# Patient Record
Sex: FEMALE | Race: WHITE | NOT HISPANIC OR LATINO | Employment: UNEMPLOYED | URBAN - NONMETROPOLITAN AREA
[De-identification: names, ages, dates, MRNs, and addresses within clinical notes are randomized per-mention and may not be internally consistent; named-entity substitution may affect disease eponyms.]

---

## 2018-12-04 DIAGNOSIS — M54.9 BACK PAIN, UNSPECIFIED BACK LOCATION, UNSPECIFIED BACK PAIN LATERALITY, UNSPECIFIED CHRONICITY: Primary | ICD-10-CM

## 2018-12-05 ENCOUNTER — OFFICE VISIT (OUTPATIENT)
Dept: ORTHOPEDIC SURGERY | Facility: CLINIC | Age: 53
End: 2018-12-05

## 2018-12-05 VITALS — WEIGHT: 217 LBS | HEIGHT: 65 IN | BODY MASS INDEX: 36.15 KG/M2

## 2018-12-05 DIAGNOSIS — M51.36 DDD (DEGENERATIVE DISC DISEASE), LUMBAR: ICD-10-CM

## 2018-12-05 DIAGNOSIS — M54.9 BACK PAIN, UNSPECIFIED BACK LOCATION, UNSPECIFIED BACK PAIN LATERALITY, UNSPECIFIED CHRONICITY: Primary | ICD-10-CM

## 2018-12-05 PROCEDURE — 99204 OFFICE O/P NEW MOD 45 MIN: CPT | Performed by: ORTHOPAEDIC SURGERY

## 2018-12-05 RX ORDER — HYDROCODONE BITARTRATE AND ACETAMINOPHEN 7.5; 325 MG/1; MG/1
1 TABLET ORAL EVERY 6 HOURS PRN
COMMUNITY
End: 2018-12-13 | Stop reason: HOSPADM

## 2018-12-05 RX ORDER — RANITIDINE 150 MG/1
300 TABLET ORAL DAILY
COMMUNITY

## 2018-12-05 RX ORDER — SPIRONOLACTONE 25 MG/1
12.5 TABLET ORAL DAILY
COMMUNITY

## 2018-12-05 RX ORDER — TIZANIDINE 4 MG/1
4 TABLET ORAL EVERY 8 HOURS PRN
COMMUNITY

## 2018-12-05 RX ORDER — BUPIVACAINE HCL/0.9 % NACL/PF 0.1 %
2 PLASTIC BAG, INJECTION (ML) EPIDURAL ONCE
Status: CANCELLED | OUTPATIENT
Start: 2018-12-11 | End: 2018-12-05
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
## 2018-12-10 ENCOUNTER — ANESTHESIA EVENT (OUTPATIENT)
Dept: PERIOP | Facility: HOSPITAL | Age: 53
End: 2018-12-10

## 2018-12-10 ENCOUNTER — APPOINTMENT (OUTPATIENT)
Dept: PREADMISSION TESTING | Facility: HOSPITAL | Age: 53
End: 2018-12-10

## 2018-12-10 ENCOUNTER — HOSPITAL ENCOUNTER (OUTPATIENT)
Dept: GENERAL RADIOLOGY | Facility: HOSPITAL | Age: 53
Discharge: HOME | End: 2018-12-10
Admitting: ORTHOPAEDIC SURGERY

## 2018-12-10 VITALS
HEIGHT: 65 IN | BODY MASS INDEX: 36.15 KG/M2 | OXYGEN SATURATION: 99 % | WEIGHT: 217 LBS | RESPIRATION RATE: 12 BRPM | HEART RATE: 61 BPM | DIASTOLIC BLOOD PRESSURE: 66 MMHG | SYSTOLIC BLOOD PRESSURE: 123 MMHG

## 2018-12-10 LAB
ABO GROUP BLD: NORMAL
ANION GAP SERPL CALCULATED.3IONS-SCNC: 9 MMOL/L (ref 5–15)
APTT PPP: 33.2 SECONDS (ref 20–40.3)
BLD GP AB SCN SERPL QL: NEGATIVE
BUN BLD-MCNC: 13 MG/DL (ref 7–21)
BUN/CREAT SERPL: 19.7 (ref 7–25)
CALCIUM SPEC-SCNC: 9.9 MG/DL (ref 8.4–10.2)
CHLORIDE SERPL-SCNC: 100 MMOL/L (ref 95–110)
CO2 SERPL-SCNC: 29 MMOL/L (ref 22–31)
CREAT BLD-MCNC: 0.66 MG/DL (ref 0.5–1)
DEPRECATED RDW RBC AUTO: 37.2 FL (ref 36.4–46.3)
ERYTHROCYTE [DISTWIDTH] IN BLOOD BY AUTOMATED COUNT: 12 % (ref 11.5–14.5)
GFR SERPL CREATININE-BSD FRML MDRD: 94 ML/MIN/1.73 (ref 51–120)
GLUCOSE BLD-MCNC: 76 MG/DL (ref 60–100)
HCT VFR BLD AUTO: 38.3 % (ref 35–45)
HGB BLD-MCNC: 13.4 G/DL (ref 12–15.5)
INR PPP: 1.03 (ref 0.8–1.2)
Lab: NORMAL
MCH RBC QN AUTO: 29.8 PG (ref 26.5–34)
MCHC RBC AUTO-ENTMCNC: 35 G/DL (ref 31.4–36)
MCV RBC AUTO: 85.1 FL (ref 80–98)
MRSA DNA SPEC QL NAA+PROBE: NEGATIVE
PLATELET # BLD AUTO: 206 10*3/MM3 (ref 150–450)
PMV BLD AUTO: 11 FL (ref 8–12)
POTASSIUM BLD-SCNC: 4 MMOL/L (ref 3.5–5.1)
PROTHROMBIN TIME: 13.3 SECONDS (ref 11.1–15.3)
RBC # BLD AUTO: 4.5 10*6/MM3 (ref 3.77–5.16)
RH BLD: POSITIVE
SODIUM BLD-SCNC: 138 MMOL/L (ref 137–145)
T&S EXPIRATION DATE: NORMAL
WBC NRBC COR # BLD: 4.2 10*3/MM3 (ref 3.2–9.8)

## 2018-12-10 PROCEDURE — 86900 BLOOD TYPING SEROLOGIC ABO: CPT | Performed by: ANESTHESIOLOGY

## 2018-12-10 PROCEDURE — 86850 RBC ANTIBODY SCREEN: CPT | Performed by: ANESTHESIOLOGY

## 2018-12-10 PROCEDURE — 86901 BLOOD TYPING SEROLOGIC RH(D): CPT | Performed by: ANESTHESIOLOGY

## 2018-12-10 PROCEDURE — 85610 PROTHROMBIN TIME: CPT | Performed by: ORTHOPAEDIC SURGERY

## 2018-12-10 PROCEDURE — 93005 ELECTROCARDIOGRAM TRACING: CPT | Performed by: ORTHOPAEDIC SURGERY

## 2018-12-10 PROCEDURE — 85027 COMPLETE CBC AUTOMATED: CPT | Performed by: ORTHOPAEDIC SURGERY

## 2018-12-10 PROCEDURE — 80048 BASIC METABOLIC PNL TOTAL CA: CPT | Performed by: ORTHOPAEDIC SURGERY

## 2018-12-10 PROCEDURE — 93010 ELECTROCARDIOGRAM REPORT: CPT | Performed by: INTERNAL MEDICINE

## 2018-12-10 PROCEDURE — 87641 MR-STAPH DNA AMP PROBE: CPT | Performed by: ORTHOPAEDIC SURGERY

## 2018-12-10 PROCEDURE — 71046 X-RAY EXAM CHEST 2 VIEWS: CPT

## 2018-12-10 PROCEDURE — 85730 THROMBOPLASTIN TIME PARTIAL: CPT | Performed by: ORTHOPAEDIC SURGERY

## 2018-12-10 RX ORDER — ALBUTEROL SULFATE 90 UG/1
2 AEROSOL, METERED RESPIRATORY (INHALATION) EVERY 4 HOURS PRN
COMMUNITY

## 2018-12-10 RX ORDER — SODIUM CHLORIDE 9 MG/ML
1000 INJECTION, SOLUTION INTRAVENOUS CONTINUOUS
Status: CANCELLED | OUTPATIENT
Start: 2018-12-11

## 2018-12-10 NOTE — DISCHARGE INSTRUCTIONS
[facility]  Pre-op Information and Guidelines    You will be called after 2 p.m. the day before your surgery (Friday for Monday surgery) and notified of your time for arrival and approximate surgery time.  If you have not received a call by 4P.M., please contact Same Day Surgery at [phone] of if outside [facility] call [phone].    Please Follow these Important Safety Guidelines:    • The morning of your procedure, take only the medications listed below with   A sip of water:_____________________________________________       __NORCO, ZANTAC____________________________    • DO NOT eat or drink anything after 12:00 midnight the night before surgery  Specific instructions concerning drinking clear liquids will be discussed during  the pre-surgery instruction call the day before your surgery.    • If you take a blood thinner (ex. Plavix, Coumadin, aspirin), ask your doctor when to stop it before surgery  STOP DATE: _________________    • Only 2 visitors are allowed in patient rooms at a time  Your visitors will be asked to wait in the lobby until the admission process is complete with the exception of a parent with a child and patients in need of special assistance.    • YOU CANNOT DRIVE YOURSELF HOME  You must be accompanied by someone who will be responsible for driving you home after surgery and for your care at home.    • DO NOT chew gum, use breath mints, hard candy, or smoke the day of surgery  • DO NOT drink alcohol for at least 24 hours before your surgery  • DO NOT wear any jewelry and remove all body piercing before coming to the hospital  • DO NOT wear make-up to the hospital  • If you are having surgery on an extremity (arm/leg/foot) remove nail polish/artificial nails on the surgical side  • Clothing, glasses, contacts, dentures, and hairpieces must be removed before surgery  • Bathe the night before or the morning of your surgery and do not use powders/lotions on  skin.

## 2018-12-11 ENCOUNTER — ANESTHESIA (OUTPATIENT)
Dept: PERIOP | Facility: HOSPITAL | Age: 53
End: 2018-12-11

## 2018-12-11 ENCOUNTER — APPOINTMENT (OUTPATIENT)
Dept: GENERAL RADIOLOGY | Facility: HOSPITAL | Age: 53
End: 2018-12-11

## 2018-12-11 ENCOUNTER — HOSPITAL ENCOUNTER (INPATIENT)
Facility: HOSPITAL | Age: 53
LOS: 2 days | Discharge: HOME HEALTH | End: 2018-12-13
Attending: ORTHOPAEDIC SURGERY | Admitting: ORTHOPAEDIC SURGERY

## 2018-12-11 DIAGNOSIS — M54.9 BACK PAIN, UNSPECIFIED BACK LOCATION, UNSPECIFIED BACK PAIN LATERALITY, UNSPECIFIED CHRONICITY: ICD-10-CM

## 2018-12-11 DIAGNOSIS — M51.36 DDD (DEGENERATIVE DISC DISEASE), LUMBAR: ICD-10-CM

## 2018-12-11 DIAGNOSIS — Z74.09 IMPAIRED FUNCTIONAL MOBILITY, BALANCE, GAIT, AND ENDURANCE: Primary | ICD-10-CM

## 2018-12-11 LAB
ABO GROUP BLD: NORMAL
BLD GP AB SCN SERPL QL: NEGATIVE
GLUCOSE BLDC GLUCOMTR-MCNC: 258 MG/DL (ref 70–130)
GLUCOSE BLDC GLUCOMTR-MCNC: 294 MG/DL (ref 70–130)
Lab: NORMAL
RH BLD: POSITIVE
T&S EXPIRATION DATE: NORMAL

## 2018-12-11 PROCEDURE — 25010000002 PROPOFOL 1000 MG/ML EMULSION: Performed by: NURSE ANESTHETIST, CERTIFIED REGISTERED

## 2018-12-11 PROCEDURE — 22853 INSJ BIOMECHANICAL DEVICE: CPT | Performed by: ORTHOPAEDIC SURGERY

## 2018-12-11 PROCEDURE — 25010000002 FENTANYL CITRATE (PF) 100 MCG/2ML SOLUTION: Performed by: NURSE ANESTHETIST, CERTIFIED REGISTERED

## 2018-12-11 PROCEDURE — C1769 GUIDE WIRE: HCPCS | Performed by: ORTHOPAEDIC SURGERY

## 2018-12-11 PROCEDURE — 0SG10A0 FUSION OF 2 OR MORE LUMBAR VERTEBRAL JOINTS WITH INTERBODY FUSION DEVICE, ANTERIOR APPROACH, ANTERIOR COLUMN, OPEN APPROACH: ICD-10-PCS | Performed by: ORTHOPAEDIC SURGERY

## 2018-12-11 PROCEDURE — 22533 ARTHRD LAT XTRCVTRY TQ LMBR: CPT | Performed by: ORTHOPAEDIC SURGERY

## 2018-12-11 PROCEDURE — C1713 ANCHOR/SCREW BN/BN,TIS/BN: HCPCS | Performed by: ORTHOPAEDIC SURGERY

## 2018-12-11 PROCEDURE — 25810000003 SODIUM CHLORIDE 0.9 % WITH KCL 20 MEQ 20-0.9 MEQ/L-% SOLUTION: Performed by: ORTHOPAEDIC SURGERY

## 2018-12-11 PROCEDURE — 22845 INSERT SPINE FIXATION DEVICE: CPT | Performed by: ORTHOPAEDIC SURGERY

## 2018-12-11 PROCEDURE — 82962 GLUCOSE BLOOD TEST: CPT

## 2018-12-11 PROCEDURE — 72100 X-RAY EXAM L-S SPINE 2/3 VWS: CPT | Performed by: ORTHOPAEDIC SURGERY

## 2018-12-11 PROCEDURE — 25010000002 HYDROMORPHONE 1 MG/ML SOLUTION: Performed by: NURSE ANESTHETIST, CERTIFIED REGISTERED

## 2018-12-11 PROCEDURE — 25010000002 HYDROMORPHONE 1 MG/ML SOLUTION: Performed by: ORTHOPAEDIC SURGERY

## 2018-12-11 PROCEDURE — 22534 ARTHRD LAT XTRCVTRY TQ EA AD: CPT | Performed by: ORTHOPAEDIC SURGERY

## 2018-12-11 PROCEDURE — 3E0U0GB INTRODUCTION OF RECOMBINANT BONE MORPHOGENETIC PROTEIN INTO JOINTS, OPEN APPROACH: ICD-10-PCS | Performed by: ORTHOPAEDIC SURGERY

## 2018-12-11 PROCEDURE — 94799 UNLISTED PULMONARY SVC/PX: CPT

## 2018-12-11 PROCEDURE — 25010000002 CEFAZOLIN PER 500 MG: Performed by: ORTHOPAEDIC SURGERY

## 2018-12-11 PROCEDURE — 25010000002 ONDANSETRON PER 1 MG: Performed by: NURSE ANESTHETIST, CERTIFIED REGISTERED

## 2018-12-11 PROCEDURE — 86900 BLOOD TYPING SEROLOGIC ABO: CPT | Performed by: ORTHOPAEDIC SURGERY

## 2018-12-11 PROCEDURE — 25010000002 PROPOFOL 10 MG/ML EMULSION: Performed by: NURSE ANESTHETIST, CERTIFIED REGISTERED

## 2018-12-11 PROCEDURE — 63710000001 INSULIN ASPART PER 5 UNITS: Performed by: ORTHOPAEDIC SURGERY

## 2018-12-11 PROCEDURE — G8979 MOBILITY GOAL STATUS: HCPCS

## 2018-12-11 PROCEDURE — 86850 RBC ANTIBODY SCREEN: CPT | Performed by: ORTHOPAEDIC SURGERY

## 2018-12-11 PROCEDURE — 25010000002 METHOCARBAMOL 1000 MG/10ML SOLUTION 10 ML VIAL: Performed by: ORTHOPAEDIC SURGERY

## 2018-12-11 PROCEDURE — 86901 BLOOD TYPING SEROLOGIC RH(D): CPT | Performed by: ORTHOPAEDIC SURGERY

## 2018-12-11 PROCEDURE — 25010000002 MIDAZOLAM PER 1 MG: Performed by: NURSE ANESTHETIST, CERTIFIED REGISTERED

## 2018-12-11 PROCEDURE — G8978 MOBILITY CURRENT STATUS: HCPCS

## 2018-12-11 PROCEDURE — 97162 PT EVAL MOD COMPLEX 30 MIN: CPT

## 2018-12-11 PROCEDURE — 25010000002 SUCCINYLCHOLINE PER 20 MG: Performed by: NURSE ANESTHETIST, CERTIFIED REGISTERED

## 2018-12-11 PROCEDURE — 76000 FLUOROSCOPY <1 HR PHYS/QHP: CPT

## 2018-12-11 DEVICE — BONE GRAFT KIT 7510400 INFUSE MEDIUM
Type: IMPLANTABLE DEVICE | Site: SPINE LUMBAR | Status: FUNCTIONAL
Brand: INFUSE® BONE GRAFT

## 2018-12-11 DEVICE — PLATE 2140002 OLIF25 2-HOLE PLATE LARGE
Type: IMPLANTABLE DEVICE | Site: SPINE LUMBAR | Status: FUNCTIONAL
Brand: PIVOX™ OBLIQUE LATERAL SPINAL SYSTEM

## 2018-12-11 DEVICE — BIOLOGICS 7600703 MSTRGRFT PUTY 3 CC KIT
Type: IMPLANTABLE DEVICE | Site: SPINE LUMBAR | Status: FUNCTIONAL
Brand: MASTERGRAFT®PUTTY

## 2018-12-11 DEVICE — BIOLOGICS 7600709 MSTRGRFT PUTY 9 CC KIT
Type: IMPLANTABLE DEVICE | Site: SPINE LUMBAR | Status: FUNCTIONAL
Brand: MASTERGRAFT®PUTTY

## 2018-12-11 RX ORDER — FLUMAZENIL 0.1 MG/ML
0.2 INJECTION INTRAVENOUS AS NEEDED
Status: DISCONTINUED | OUTPATIENT
Start: 2018-12-11 | End: 2018-12-11 | Stop reason: HOSPADM

## 2018-12-11 RX ORDER — MIDAZOLAM HYDROCHLORIDE 1 MG/ML
INJECTION INTRAMUSCULAR; INTRAVENOUS AS NEEDED
Status: DISCONTINUED | OUTPATIENT
Start: 2018-12-11 | End: 2018-12-11 | Stop reason: SURG

## 2018-12-11 RX ORDER — EPHEDRINE SULFATE 50 MG/ML
5 INJECTION, SOLUTION INTRAVENOUS ONCE AS NEEDED
Status: DISCONTINUED | OUTPATIENT
Start: 2018-12-11 | End: 2018-12-11 | Stop reason: HOSPADM

## 2018-12-11 RX ORDER — SUCCINYLCHOLINE CHLORIDE 20 MG/ML
INJECTION INTRAMUSCULAR; INTRAVENOUS AS NEEDED
Status: DISCONTINUED | OUTPATIENT
Start: 2018-12-11 | End: 2018-12-11 | Stop reason: SURG

## 2018-12-11 RX ORDER — LIDOCAINE HYDROCHLORIDE 20 MG/ML
INJECTION, SOLUTION INFILTRATION; PERINEURAL AS NEEDED
Status: DISCONTINUED | OUTPATIENT
Start: 2018-12-11 | End: 2018-12-11 | Stop reason: SURG

## 2018-12-11 RX ORDER — BACITRACIN 50000 [IU]/1
INJECTION, POWDER, FOR SOLUTION INTRAMUSCULAR AS NEEDED
Status: DISCONTINUED | OUTPATIENT
Start: 2018-12-11 | End: 2018-12-13 | Stop reason: HOSPADM

## 2018-12-11 RX ORDER — SODIUM CHLORIDE 9 MG/ML
1000 INJECTION, SOLUTION INTRAVENOUS CONTINUOUS
Status: ACTIVE | OUTPATIENT
Start: 2018-12-11 | End: 2018-12-12

## 2018-12-11 RX ORDER — ALBUTEROL SULFATE 2.5 MG/3ML
2.5 SOLUTION RESPIRATORY (INHALATION) EVERY 4 HOURS PRN
Status: DISCONTINUED | OUTPATIENT
Start: 2018-12-11 | End: 2018-12-13 | Stop reason: HOSPADM

## 2018-12-11 RX ORDER — ONDANSETRON 2 MG/ML
INJECTION INTRAMUSCULAR; INTRAVENOUS AS NEEDED
Status: DISCONTINUED | OUTPATIENT
Start: 2018-12-11 | End: 2018-12-11 | Stop reason: SURG

## 2018-12-11 RX ORDER — BUPIVACAINE HCL/0.9 % NACL/PF 0.1 %
2 PLASTIC BAG, INJECTION (ML) EPIDURAL EVERY 8 HOURS
Status: COMPLETED | OUTPATIENT
Start: 2018-12-11 | End: 2018-12-12

## 2018-12-11 RX ORDER — SODIUM CHLORIDE AND POTASSIUM CHLORIDE 150; 900 MG/100ML; MG/100ML
100 INJECTION, SOLUTION INTRAVENOUS CONTINUOUS
Status: DISCONTINUED | OUTPATIENT
Start: 2018-12-11 | End: 2018-12-13 | Stop reason: HOSPADM

## 2018-12-11 RX ORDER — PROPOFOL 10 MG/ML
VIAL (ML) INTRAVENOUS AS NEEDED
Status: DISCONTINUED | OUTPATIENT
Start: 2018-12-11 | End: 2018-12-11 | Stop reason: SURG

## 2018-12-11 RX ORDER — ACETAMINOPHEN 650 MG/1
650 SUPPOSITORY RECTAL ONCE AS NEEDED
Status: DISCONTINUED | OUTPATIENT
Start: 2018-12-11 | End: 2018-12-11 | Stop reason: HOSPADM

## 2018-12-11 RX ORDER — TIZANIDINE 4 MG/1
4 TABLET ORAL EVERY 8 HOURS PRN
Status: DISCONTINUED | OUTPATIENT
Start: 2018-12-11 | End: 2018-12-13 | Stop reason: HOSPADM

## 2018-12-11 RX ORDER — FENTANYL CITRATE 50 UG/ML
INJECTION, SOLUTION INTRAMUSCULAR; INTRAVENOUS AS NEEDED
Status: DISCONTINUED | OUTPATIENT
Start: 2018-12-11 | End: 2018-12-11 | Stop reason: SURG

## 2018-12-11 RX ORDER — DIPHENHYDRAMINE HYDROCHLORIDE 50 MG/ML
12.5 INJECTION INTRAMUSCULAR; INTRAVENOUS
Status: DISCONTINUED | OUTPATIENT
Start: 2018-12-11 | End: 2018-12-11 | Stop reason: HOSPADM

## 2018-12-11 RX ORDER — ONDANSETRON 2 MG/ML
4 INJECTION INTRAMUSCULAR; INTRAVENOUS ONCE AS NEEDED
Status: DISCONTINUED | OUTPATIENT
Start: 2018-12-11 | End: 2018-12-11 | Stop reason: HOSPADM

## 2018-12-11 RX ORDER — PROMETHAZINE HYDROCHLORIDE 25 MG/ML
12.5 INJECTION, SOLUTION INTRAMUSCULAR; INTRAVENOUS EVERY 6 HOURS PRN
Status: DISCONTINUED | OUTPATIENT
Start: 2018-12-11 | End: 2018-12-13 | Stop reason: HOSPADM

## 2018-12-11 RX ORDER — LABETALOL HYDROCHLORIDE 5 MG/ML
5 INJECTION, SOLUTION INTRAVENOUS
Status: DISCONTINUED | OUTPATIENT
Start: 2018-12-11 | End: 2018-12-11 | Stop reason: HOSPADM

## 2018-12-11 RX ORDER — DEXTROSE MONOHYDRATE 25 G/50ML
25 INJECTION, SOLUTION INTRAVENOUS
Status: DISCONTINUED | OUTPATIENT
Start: 2018-12-11 | End: 2018-12-13 | Stop reason: HOSPADM

## 2018-12-11 RX ORDER — DOCUSATE SODIUM 100 MG/1
100 CAPSULE, LIQUID FILLED ORAL 2 TIMES DAILY
Status: DISCONTINUED | OUTPATIENT
Start: 2018-12-11 | End: 2018-12-13 | Stop reason: HOSPADM

## 2018-12-11 RX ORDER — FAMOTIDINE 20 MG/1
20 TABLET, FILM COATED ORAL DAILY
Status: DISCONTINUED | OUTPATIENT
Start: 2018-12-12 | End: 2018-12-13 | Stop reason: HOSPADM

## 2018-12-11 RX ORDER — NICOTINE POLACRILEX 4 MG
15 LOZENGE BUCCAL
Status: DISCONTINUED | OUTPATIENT
Start: 2018-12-11 | End: 2018-12-13 | Stop reason: HOSPADM

## 2018-12-11 RX ORDER — BUPIVACAINE HCL/0.9 % NACL/PF 0.1 %
2 PLASTIC BAG, INJECTION (ML) EPIDURAL ONCE
Status: COMPLETED | OUTPATIENT
Start: 2018-12-11 | End: 2018-12-11

## 2018-12-11 RX ORDER — MEPERIDINE HYDROCHLORIDE 50 MG/ML
12.5 INJECTION INTRAMUSCULAR; INTRAVENOUS; SUBCUTANEOUS
Status: DISCONTINUED | OUTPATIENT
Start: 2018-12-11 | End: 2018-12-11 | Stop reason: HOSPADM

## 2018-12-11 RX ORDER — SODIUM CHLORIDE, SODIUM GLUCONATE, SODIUM ACETATE, POTASSIUM CHLORIDE, AND MAGNESIUM CHLORIDE 526; 502; 368; 37; 30 MG/100ML; MG/100ML; MG/100ML; MG/100ML; MG/100ML
INJECTION, SOLUTION INTRAVENOUS CONTINUOUS PRN
Status: DISCONTINUED | OUTPATIENT
Start: 2018-12-11 | End: 2018-12-11 | Stop reason: SURG

## 2018-12-11 RX ORDER — 0.9 % SODIUM CHLORIDE 0.9 %
VIAL (ML) INJECTION AS NEEDED
Status: DISCONTINUED | OUTPATIENT
Start: 2018-12-11 | End: 2018-12-13 | Stop reason: HOSPADM

## 2018-12-11 RX ORDER — MAGNESIUM HYDROXIDE 1200 MG/15ML
LIQUID ORAL AS NEEDED
Status: DISCONTINUED | OUTPATIENT
Start: 2018-12-11 | End: 2018-12-13 | Stop reason: HOSPADM

## 2018-12-11 RX ORDER — ACETAMINOPHEN 325 MG/1
650 TABLET ORAL ONCE AS NEEDED
Status: DISCONTINUED | OUTPATIENT
Start: 2018-12-11 | End: 2018-12-11 | Stop reason: HOSPADM

## 2018-12-11 RX ORDER — NALOXONE HCL 0.4 MG/ML
0.2 VIAL (ML) INJECTION AS NEEDED
Status: DISCONTINUED | OUTPATIENT
Start: 2018-12-11 | End: 2018-12-11 | Stop reason: HOSPADM

## 2018-12-11 RX ORDER — OXYCODONE AND ACETAMINOPHEN 7.5; 325 MG/1; MG/1
1 TABLET ORAL EVERY 4 HOURS PRN
Status: DISCONTINUED | OUTPATIENT
Start: 2018-12-11 | End: 2018-12-13 | Stop reason: HOSPADM

## 2018-12-11 RX ORDER — SODIUM CHLORIDE 9 MG/ML
INJECTION, SOLUTION INTRAVENOUS CONTINUOUS PRN
Status: DISCONTINUED | OUTPATIENT
Start: 2018-12-11 | End: 2018-12-11 | Stop reason: SURG

## 2018-12-11 RX ADMIN — SUCCINYLCHOLINE CHLORIDE 140 MG: 20 INJECTION, SOLUTION INTRAMUSCULAR; INTRAVENOUS at 07:53

## 2018-12-11 RX ADMIN — SODIUM CHLORIDE 1000 ML: 900 INJECTION, SOLUTION INTRAVENOUS at 06:17

## 2018-12-11 RX ADMIN — FENTANYL CITRATE 50 MCG: 50 INJECTION, SOLUTION INTRAMUSCULAR; INTRAVENOUS at 09:18

## 2018-12-11 RX ADMIN — INSULIN ASPART 12 UNITS: 100 INJECTION, SOLUTION INTRAVENOUS; SUBCUTANEOUS at 22:02

## 2018-12-11 RX ADMIN — SODIUM CHLORIDE 2 G: 9 INJECTION, SOLUTION INTRAVENOUS at 15:12

## 2018-12-11 RX ADMIN — SODIUM CHLORIDE: 9 INJECTION, SOLUTION INTRAVENOUS at 07:34

## 2018-12-11 RX ADMIN — LIDOCAINE HYDROCHLORIDE 80 MG: 20 INJECTION, SOLUTION INFILTRATION; PERINEURAL at 07:52

## 2018-12-11 RX ADMIN — METHOCARBAMOL 1000 MG: 100 INJECTION INTRAMUSCULAR; INTRAVENOUS at 21:58

## 2018-12-11 RX ADMIN — FENTANYL CITRATE 50 MCG: 50 INJECTION, SOLUTION INTRAMUSCULAR; INTRAVENOUS at 08:24

## 2018-12-11 RX ADMIN — PROPOFOL 120 MG: 10 INJECTION, EMULSION INTRAVENOUS at 07:53

## 2018-12-11 RX ADMIN — METHOCARBAMOL 1000 MG: 100 INJECTION INTRAMUSCULAR; INTRAVENOUS at 12:36

## 2018-12-11 RX ADMIN — MIDAZOLAM HYDROCHLORIDE 2 MG: 2 INJECTION, SOLUTION INTRAMUSCULAR; INTRAVENOUS at 07:34

## 2018-12-11 RX ADMIN — DOCUSATE SODIUM 100 MG: 100 CAPSULE, LIQUID FILLED ORAL at 20:39

## 2018-12-11 RX ADMIN — INSULIN ASPART 12 UNITS: 100 INJECTION, SOLUTION INTRAVENOUS; SUBCUTANEOUS at 17:40

## 2018-12-11 RX ADMIN — HYDROMORPHONE HYDROCHLORIDE 0.5 MG: 1 INJECTION, SOLUTION INTRAMUSCULAR; INTRAVENOUS; SUBCUTANEOUS at 12:23

## 2018-12-11 RX ADMIN — OXYCODONE HYDROCHLORIDE AND ACETAMINOPHEN 1 TABLET: 7.5; 325 TABLET ORAL at 15:42

## 2018-12-11 RX ADMIN — POTASSIUM CHLORIDE AND SODIUM CHLORIDE 100 ML/HR: 900; 150 INJECTION, SOLUTION INTRAVENOUS at 13:48

## 2018-12-11 RX ADMIN — HYDROMORPHONE HYDROCHLORIDE 0.5 MG: 1 INJECTION, SOLUTION INTRAMUSCULAR; INTRAVENOUS; SUBCUTANEOUS at 12:56

## 2018-12-11 RX ADMIN — HYDROMORPHONE HYDROCHLORIDE 1 MG: 1 INJECTION, SOLUTION INTRAMUSCULAR; INTRAVENOUS; SUBCUTANEOUS at 19:09

## 2018-12-11 RX ADMIN — ONDANSETRON 4 MG: 2 INJECTION INTRAMUSCULAR; INTRAVENOUS at 11:17

## 2018-12-11 RX ADMIN — SODIUM CHLORIDE: 900 INJECTION, SOLUTION INTRAVENOUS at 07:42

## 2018-12-11 RX ADMIN — PROPOFOL 30 MG: 10 INJECTION, EMULSION INTRAVENOUS at 08:00

## 2018-12-11 RX ADMIN — Medication 2 G: at 08:01

## 2018-12-11 RX ADMIN — FENTANYL CITRATE 100 MCG: 50 INJECTION, SOLUTION INTRAMUSCULAR; INTRAVENOUS at 07:50

## 2018-12-11 RX ADMIN — PROPOFOL 70 MCG/KG/MIN: 10 INJECTION, EMULSION INTRAVENOUS at 08:12

## 2018-12-11 RX ADMIN — OXYCODONE HYDROCHLORIDE AND ACETAMINOPHEN 1 TABLET: 7.5; 325 TABLET ORAL at 20:39

## 2018-12-11 RX ADMIN — SODIUM CHLORIDE, SODIUM GLUCONATE, SODIUM ACETATE, POTASSIUM CHLORIDE, AND MAGNESIUM CHLORIDE: 526; 502; 368; 37; 30 INJECTION, SOLUTION INTRAVENOUS at 10:32

## 2018-12-11 RX ADMIN — HYDROMORPHONE HYDROCHLORIDE 1 MG: 1 INJECTION, SOLUTION INTRAMUSCULAR; INTRAVENOUS; SUBCUTANEOUS at 13:48

## 2018-12-11 NOTE — PLAN OF CARE
Problem: Patient Care Overview  Goal: Plan of Care Review  Outcome: Ongoing (interventions implemented as appropriate)   12/11/18 0743   Coping/Psychosocial   Plan of Care Reviewed With patient   Plan of Care Review   Progress no change   OTHER   Outcome Summary PT jami completed this date with patient agreeable to physical therapy and pleasant throughout. Patient educated on spinal precautions and performed log rolling techniques well with min A and verbal cues for rolling (left and right) and sidelying to sit to sidelying. Patient required min A for sit<>stand with FWW, and ambulated 6 steps forward and backwards with CGA and FWW. Pt tolerated evaluation well with improvements in pain levels post repositioning. Patient would benefit from skilled PT to improve functional mobility, balance, endurance, strength, and reduce the risk of falls. D/C recommendation includes home with 24/7 assist and  PT depending on progress made in acute care.

## 2018-12-11 NOTE — INTERVAL H&P NOTE
H&P reviewed. The patient was examined and there are no changes to the H&P.      Temp:  [98.3 °F (36.8 °C)] 98.3 °F (36.8 °C)  Heart Rate:  [61-76] 76  Resp:  [12-18] 18  BP: (123-134)/(66-93) 134/93    Allergies   Allergen Reactions   • Influenza Vaccines Nausea Only and Swelling   • Pneumococcal Vaccines Nausea Only and Swelling   • Corticosteroids Nausea Only and Rash       Prior to Admission medications    Medication Sig Start Date End Date Taking? Authorizing Provider   Calcium-Vitamin D-Vitamin K (VIACTIV) 500-500-40 MG-UNT-MCG chewable tablet Chew 2 tablets Daily.   Yes Chase Stern MD   HYDROcodone-acetaminophen (NORCO) 7.5-325 MG per tablet Take 1 tablet by mouth Every 6 (Six) Hours As Needed for Moderate Pain .   Yes Chase Stern MD   raNITIdine (ZANTAC) 150 MG tablet Take 300 mg by mouth Daily.   Yes Chase Stern MD   spironolactone (ALDACTONE) 25 MG tablet Take 12.5 mg by mouth Daily.   Yes Chase Stern MD   albuterol 108 (90 Base) MCG/ACT inhaler Inhale 2 puffs Every 4 (Four) Hours As Needed for Wheezing.    ProviderChase MD   tiZANidine (ZANAFLEX) 4 MG tablet Take 4 mg by mouth Every 8 (Eight) Hours As Needed for Muscle Spasms.    ProviderChase MD       Past Medical History:   Diagnosis Date   • Arthritis    • Asthma    • Depression with anxiety    • Diabetes (CMS/[facility])     no longer using medication   • Hypertension    • Kidney disease    • Kidney stone    • Lupus    • Sleep apnea     using c-pap occasionally   • Stomach ulcer    • Thyroid disease        Past Surgical History:   Procedure Laterality Date   • CHOLECYSTECTOMY  1997   • CYSTOSCOPY INSERTION / REMOVAL STENT / STONE  2015   • FOOT SURGERY Right 1998   • GASTRIC BYPASS  2016   • HYSTERECTOMY  2006   • TUBAL ABDOMINAL LIGATION  1989       Social History     Socioeconomic History   • Marital status:      Spouse name: Not on file   • Number of children: Not on file   • Years of  education: Not on file   • Highest education level: Not on file   Social Needs   • Financial resource strain: Not on file   • Food insecurity - worry: Not on file   • Food insecurity - inability: Not on file   • Transportation needs - medical: Not on file   • Transportation needs - non-medical: Not on file   Occupational History   • Not on file   Tobacco Use   • Smoking status: Never Smoker   • Smokeless tobacco: Never Used   Substance and Sexual Activity   • Alcohol use: No     Frequency: Never   • Drug use: No   • Sexual activity: Defer   Other Topics Concern   • Not on file   Social History Narrative   • Not on file       Family History   Problem Relation Age of Onset   • Hypertension Mother    • Diabetes Mother    • Heart disease Father    • Hypertension Father    • Diabetes Father    • Cancer Paternal Aunt    • Hypertension Maternal Grandmother    • Diabetes Maternal Grandmother    • Diabetes Maternal Grandfather    • Heart disease Paternal Grandmother    • Hypertension Paternal Grandmother    • Diabetes Paternal Grandmother    • Diabetes Paternal Grandfather          Back pain    DDD (degenerative disc disease), lumbar

## 2018-12-11 NOTE — OP NOTE
DATE OF PROCEDURE:  12/11/2018     PREOPERATIVE DIAGNOSES:       1.   Lumbar degenerative disk disease L3-4  2.   Lumbar degenerative disc disease L4-5  3   Lumbar spinal stenosis of L3-4, L4-5  4    obesity  5    Diabetes mellitus        POSTOPERATIVE DIAGNOSES:     1.   Lumbar degenerative disk disease L3-4..  2.   Lumbar degenerative disc disease L4-5.  3    Lumbar spinal stenosis of L3-4 and L4-5  4.   obesity  5   Diabetes mellitus         PROCEDURES:  1.   Oblique Lateral approach Lumbar interbody arthrodesis L3-4  2    Oblique lateral approach lumbar interbody arthrodesis L4-5  3.   Placement of cage instrumentation for arthrodesis L3-4  4    Placement of cage instrumentation for arthrodesis L4-5  3.   Placement of lateral plate fixation for  lateral spinal arthrodesis at L3-4 and L4-5  4.   Radiographic evaluation of lumbar spine.        Surgeon Zoltan  ASSISTANT:   Tameka Ernandez CSA      ANESTHESIA:  General.      POSITIONING:    lateral.      ESTIMATED BLOOD LOSS:  50 mL.      DESCRIPTION:  Mrs Zuniga is brought to the operating room. She had  neuromonitoring leads and was positioned lateral after she was also  intubated. Airway was secured.  She was positioned lateral on the table,   rotation of the pelvis and an axillary roll was placed. She was  secured in position. Pre operative xray for positioning orthogonal radiographic views, and marking of the operative levels. The left flank region is sterilized with a  ChloraPrep based scrub. Standard surgical barriers were assembled in the  usual fashion, utilizing sterile surgical technique throughout the  course of the procedure. Intravenous antibiotics were administered prior  to skin incision. Her consent form was reviewed and everyone agrees. X-  rays and MRI are reviewed prior to the skin incision verifying adjacent level degenerative disk disease at   L3-4, and L4-5, severe degenerative disc disease. The spine was x-rayed prior to  beginning the  procedure to assess the position of the operative level relative to  parallel and perpendicular planes within the operating room. Incision  was made on the anterior lateral left flank, anterior to the iliac crest, projecting and in plane to the L4-5 disc space. This was an oblique incision, and 3 cm. I dissected through the subcutaneous tissue, through external and internal  oblique, and the transversus abdominis layer, and through the  transversalis fascia, entered the retroperitoneal space, and I bluntly  dissected down to the anterior longitudinal margin of psoas.  radiographic evaluation was  utilized throughout the procedure.   A guidepin was placed into the  disk space of L4-5, also tested for proximity of the nerve roots. I positioned the  Retractor over the guidewire.  This approach is an oblique lateral approach.  The psoas muscle is retracted posteriorly.  the left common iliac artery is retracted anteriorly.  The procedure is done with real time radiographic guidance. I exposed  the disk space.    I tested for position of the nerve roots, and satisfied they were retracted.  I incised the annulus with a knife.   I was  able to position a Garza through the annulus, and then disk space elaine  were utilized. The disc is evacuated.    I used curettes and rasps, removing disc fragments with a pituitary.  I placed a trial and a size 10 had a good fit.  I irrigated the disc space.  I  utilized a size 10  PEEK  pivox cage packed with mastergraft, allograft bone matrix and INFUSE biologic for bone morphogenic protein.   the cage was a PEEK 10mm, 12 degree lordotic cage.  I impacted it into the disk space at L4-5. This was done with fluoroscopic guidance.  Bleeding was controlled.    xrays show positioning of the cage in the disc space, and stable position.  i placed a lateral pivox plate, with two screws, one in L4 and one in L5 for stabilization.  The AP and lateral views demonstrate interbody fusion at L4-5,  excellent placement of cage and plate fixation.  I removed the retractors, irrigated the wound, placed floseal over the discectomy.  I closed the external oblique muscle layer, closed the overlying fascia.  Vicryl closure of the skin.  A second incision is placed, more cephalad, and anterior of the L3-4 interbody disc space plane.  I dissected bluntly through the obliquus muscle planes, through the transversalis fascia.  I entered the retroperitoneal space, dissected bluntly through the retroperitoneum, and docked onto the disc at L3-4.  A guidewire is placed and position is verified with AP and lateral fluoroscopic viewed.  I placed the retractor.  I exposed the L3-4 disc, no neuromonitoring returns from the adjacent psoas muscle.  The psoas is retracted posteriorly.  I cut the annulus with a knife.  Removed disc fibers with a pituitary.  A ontiveros is placed to open the annulus on the down side.  I placed the endplate dissectors, up to size 12.  The endplate is rasped and worked with curettes.  Pituitary is used for removal of disc fragments.  I placed a trial 12mm, positioned well on the AP and lateral views.  i removed the trial, then placed a 14mm trial.  The implant is a 14mm 12 degree PEEK pivox cage, packed with mastergraft tricalcium phosphate and INFUSE biologic bone morphogenic protein.  I irrigated the disc space, then placed the implant, impacted into position at L3-4.  The implant has excellent position.  I placed the lateral pivox plate, with two screws, one in L3 and one in L4, from the lateral side, for fixation of the interbody space at L3-4.  I irrigated the tissues. floseal placed overlying the lateral margin, retractor is removed.  AP and lateral views demonstrate interbody fusion with cage placement L3-4 and L4-5 with lateral stabilization plates for arthrodesis at L3-4 and L4-5.        I irrigated the tissues and placed Floseal at  the bed of where the implant was placed. The wound was irrigated  and then closed in layered fashion the  transversalis and abdominis layer, the oblique musculature layer, and  subcutaneous tissue then subcuticular skin layer, and sterile dressings were placed. The patient was  repositioned supine.   neuromonitoring data is stable.  she is extubated and taken to recovery.  No  Complications .

## 2018-12-11 NOTE — BRIEF OP NOTE
LUMBAR DIRECT LATERAL INTERBODY FUSION  Progress Note    Lashell Zuniga  12/11/2018    Pre-op Diagnosis:   Back pain, unspecified back location, unspecified back pain laterality, unspecified chronicity [M54.9]  DDD (degenerative disc disease), lumbar [M51.36]       Post-Op Diagnosis Codes:     * Back pain, unspecified back location, unspecified back pain laterality, unspecified chronicity [M54.9]     * DDD (degenerative disc disease), lumbar [M51.36]    Procedure/CPT® Codes:  DE ARTHRODESIS LATERAL EXTRACAVITARY LUMBAR [35462]  DE ARTHRODESIS LATERAL EXTRACAVITARY EA ADDL THRC/LMBR [02377]  DE INSJ BIOMCHN DEV INTERVERTEBRAL DSC SPC W/ARTHRD [41732]  DE INSJ BIOMCHN DEV INTERVERTEBRAL DSC SPC W/ARTHRD [36436]  DE ANTERIOR INSTRUMENTATION 2-3 VERTEBRAL SEGMENTS [40137]  DE ANTERIOR INSTRUMENTATION 2-3 VERTEBRAL SEGMENTS [73436]  CHG X-RAY LUMBAR SPINE 2/3 VW [66092]    Procedure(s):  Oblique LATERAL INTERBODY FUSION LUMBAR THREE-FOUR, LUMBAR FOUR-FIVE, WITH INSTRUMENTATION, and plate fixation, INFUSE BONE GRAFT         neurotC-ARM#3)    Surgeon(s):  Matt Salcedo MD    Anesthesia: General    Staff:   Circulator: Balta Dahl RN; Cleo Dobbs RN  Radiology Technologist: Candice Ramos  Scrub Person: Shannon Murry  Assistant: Tameka Ernandez CSA    Estimated Blood Loss: 50    Urine Voided: * No values recorded between 12/11/2018  7:33 AM and 12/11/2018 12:01 PM *    Specimens:                None      Drains:   Urethral Catheter Silicone 16 Fr. (Active)       Findings: lumbar degenerative disc disease L3-4, L4-5    Complications: none      Matt Salcedo MD     Date: 12/11/2018  Time: 12:08 PM

## 2018-12-11 NOTE — THERAPY EVALUATION
Acute Care - Physical Therapy Initial Evaluation  [facility]     Patient Name: Lashell Zuniga  : 1965  MRN: [id number]  Today's Date: 2018   Onset of Illness/Injury or Date of Surgery: 18  Date of Referral to PT: 18  Referring Physician: Matt Salcedo MD      Admit Date: 2018    Visit Dx:     ICD-10-CM ICD-9-CM   1. Impaired functional mobility, balance, gait, and endurance Z74.09 V49.89   2. Back pain, unspecified back location, unspecified back pain laterality, unspecified chronicity M54.9 724.5   3. DDD (degenerative disc disease), lumbar M51.36 722.52     Patient Active Problem List   Diagnosis   • Back pain   • DDD (degenerative disc disease), lumbar     Past Medical History:   Diagnosis Date   • Arthritis    • Asthma    • Depression with anxiety    • Diabetes (CMS/HCC)     no longer using medication   • Hypertension    • Kidney disease    • Kidney stone    • Lupus    • Sleep apnea     using c-pap occasionally   • Stomach ulcer    • Thyroid disease      Past Surgical History:   Procedure Laterality Date   • CHOLECYSTECTOMY     • CYSTOSCOPY INSERTION / REMOVAL STENT / STONE     • FOOT SURGERY Right 1998   • GASTRIC BYPASS  2016   • HYSTERECTOMY  2006   • TUBAL ABDOMINAL LIGATION          PT ASSESSMENT (last 12 hours)      Physical Therapy Evaluation     Row Name 18 1549          PT Evaluation Time/Intention    Subjective Information  complains of;pain  -CW     Document Type  evaluation  -CW     Mode of Treatment  individual therapy;physical therapy  -CW     Patient Effort  good  -CW     Comment  Gait belt high on chest per incisions  -CW     Row Name 18 1549          General Information    Patient Profile Reviewed?  yes  -CW     Onset of Illness/Injury or Date of Surgery  18  -CW     Referring Physician  Matt Salcedo MD  -CW     Patient Observations  alert;cooperative;agree to therapy  -CW     Patient/Family  Observations  no family present  -CW     General Observations of Patient  Pt supine in bed on room air, IV, urinary catheter  -CW     Prior Level of Function  independent:;gait;transfer;feeding;grooming;dressing;bathing;home management;driving;shopping;using stairs  -CW     Equipment Currently Used at Home  walker, rolling;cane, straight states these are her mothers, uses what mother isn't prn  -CW     Pertinent History of Current Functional Problem  Pt is s/p lumbar fusion on 12/11/18.  -CW     Existing Precautions/Restrictions  fall;spinal  -CW     Equipment Issued to Patient  gait belt  -CW     Risks Reviewed  patient:;LOB;nausea/vomiting;dizziness;increased discomfort;change in vital signs;increased drainage;lines disloged  -CW     Benefits Reviewed  patient:;improve function;increase independence;increase strength;increase balance;decrease pain;decrease risk of DVT;improve skin integrity;increase knowledge  -CW     Barriers to Rehab  previous functional deficit history of LE involvement causing falls  -CW     Row Name 12/11/18 1549          Relationship/Environment    Primary Source of Support/Comfort  spouse;child(margarito)  -CW     Lives With  spouse  -CW     Row Name 12/11/18 1549          Resource/Environmental Concerns    Current Living Arrangements  home/apartment/condo  -CW     Row Name 12/11/18 1549          Home Main Entrance    Number of Stairs, Main Entrance  three  -CW     Stairs Comment, Main Entrance  Stairs at front entrance, does not use front entrance, enters through car port with no stairs  -CW     Row Name 12/11/18 1545          Cognitive Assessment/Interventions    Additional Documentation  Cognitive Assessment/Intervention (Group)  -CW     Row Name 12/11/18 154          Cognitive Assessment/Intervention- PT/OT    Affect/Mental Status (Cognitive)  WNL  -CW     Orientation Status (Cognition)  oriented x 4  -CW     Follows Commands (Cognition)  follows one step commands;over 90% accuracy  -CW      Personal Safety Interventions  fall prevention program maintained;gait belt;nonskid shoes/slippers when out of bed;supervised activity  -     Row Name 12/11/18 1549          Safety Issues, Functional Mobility    Safety Issues Affecting Function (Mobility)  safety precautions follow-through/compliance  -     Impairments Affecting Function (Mobility)  coordination;balance;pain;postural/trunk control;strength;endurance/activity tolerance  -     Row Name 12/11/18 1549          Bed Mobility Assessment/Treatment    Bed Mobility Assessment/Treatment  rolling left;rolling right;sidelying-sit;sit-sidelying  -CW     Rolling Left [place] (Bed Mobility)  verbal cues;minimum assist (75% patient effort)  -CW     Rolling Right [place] (Bed Mobility)  verbal cues;minimum assist (75% patient effort)  -CW     Sidelying-Sit [place] (Bed Mobility)  verbal cues;minimum assist (75% patient effort)  -CW     Sit-Sidelying [place]elope (Bed Mobility)  verbal cues;minimum assist (75% patient effort)  -     Bed Mobility, Safety Issues  decreased use of legs for bridging/pushing;decreased use of arms for pushing/pulling;impaired trunk control for bed mobility  -     Assistive Device (Bed Mobility)  bed rails  -     Comment (Bed Mobility)  Bed flattened for bed mobility for log rolling  -     Row Name 12/11/18 1549          Transfer Assessment/Treatment    Transfer Assessment/Treatment  sit-stand transfer;stand-sit transfer  -     Sit-Stand [place] (Transfers)  minimum assist (75% patient effort);verbal cues  -     Stand-Sit [place] (Transfers)  minimum assist (75% patient effort)  -     Row Name 12/11/18 1549          Sit-Stand Transfer    Assistive Device (Sit-Stand Transfers)  walker, front-wheeled  -     Row Name 12/11/18 1549          Stand-Sit Transfer    Assistive Device (Stand-Sit Transfers)  walker, front-wheeled  -     Row Name 12/11/18 1549          Gait/Stairs Assessment/Training     Gait/Stairs Assessment/Training  gait/ambulation independence;gait/ambulation assistive device;distance ambulated;gait pattern;gait deviations  -CW     [place] Level (Gait)  contact guard  -CW     Assistive Device (Gait)  walker, front-wheeled  -CW     Distance in Feet (Gait)  6 steps forward, 6 steps back (about 4 feet)  -CW     Pattern (Gait)  step-to  -CW     Deviations/Abnormal Patterns (Gait)  antalgic;stride length decreased;gait speed decreased;base of support, wide  -CW     Row Name 12/11/18 1549          General ROM    GENERAL ROM COMMENTS  BLE AROM WFL, BUE AROM WFL  -CW     Row Name 12/11/18 1549          MMT (Manual Muscle Testing)    General MMT Comments  BLE grossly assessed: ankle plantaflexion, dorsiflexion : 4/5, knee flexion, extension 3+/5, and hip flexion 3/5 (no resistance applied per pain)  -     Row Name 12/11/18 1549          Motor Assessment/Intervention    Additional Documentation  Balance (Group)  -     Row Name 12/11/18 1549          Balance    Balance  static sitting balance  -     Row Name 12/11/18 1549          Static Sitting Balance    Level of [place] (Supported Sitting, Static Balance)  standby assist  -CW     Sitting Position (Supported Sitting, Static Balance)  sitting on edge of bed  -CW     Time Able to Maintain Position (Supported Sitting, Static Balance)  3 to 4 minutes  -     Row Name 12/11/18 1549          Sensory Assessment/Intervention    Sensory General Assessment  no sensation deficits identified  -     Row Name 12/11/18 1549          Vision Assessment/Intervention    Visual Impairment/Limitations  corrective lenses full time  -     Row Name 12/11/18 1549          Pain Assessment    Additional Documentation  Pain Scale: Numbers Pre/Post-Treatment (Group)  -CW     Row Name 12/11/18 1549          Pain Scale: Numbers Pre/Post-Treatment    Pain Scale: Numbers, Pretreatment  8/10  -CW     Pain Scale: Numbers, Post-Treatment  5/10  -CW     Pain Location   back  -CW     Pre/Post Treatment Pain Comment  Patient stated her back felt better post ambulation and repositioning in bed  -CW     Pain Intervention(s)  Repositioned;Ambulation/increased activity  -CW     Row Name             Wound 12/11/18 1145 Left incision;surgical    Wound - Properties Group Date first assessed: 12/11/18  -KW Time first assessed: 1145  -KW Side: Left  -KW Type: incision;surgical  -KW    Row Name 12/11/18 1549          Coping    Observed Emotional State  calm;cooperative;pleasant  -CW     Row Name 12/11/18 1549          Plan of Care Review    Plan of Care Reviewed With  patient  -CW     Row Name 12/11/18 1549          Physical Therapy Clinical Impression    Date of Referral to PT  12/11/18  -CW     PT Diagnosis (PT Clinical Impression)  Impaired functional mobility, balance, gait, and endurance  -CW     Prognosis (PT Clinical Impression)  good  -CW     Functional Level at Time of Evaluation (PT Clinical Impression)  min A for supine to sidelying, sidelying to sit, sit to stand, and CGA for ambulation 6 steps fwd/back with FWW  -CW     Patient/Family Goals Statement (PT Clinical Impression)  to return to prior level of function  -CW     Criteria for Skilled Interventions Met (PT Clinical Impression)  yes  -CW     Pathology/Pathophysiology Noted (Describe Specifically for Each System)  musculoskeletal  -CW     Impairments Found (describe specific impairments)  aerobic capacity/endurance;ROM;gait, locomotion, and balance;ergonomics and body mechanics;muscle performance;posture  -CW     Rehab Potential (PT Clinical Summary)  good, to achieve stated therapy goals  -CW     Predicted Duration of Therapy (PT)  until d/c from acute care or all goals met  -CW     Care Plan Review (PT)  evaluation/treatment results reviewed;patient/other agree to care plan  -CW     Row Name 12/11/18 1549          Vital Signs    Pre Systolic BP Rehab  122  -CW     Pre Treatment Diastolic BP  61  -CW     Pretreatment Heart  Rate (beats/min)  127  -CW     Pre SpO2 (%)  96  -CW     O2 Delivery Pre Treatment  room air  -CW     Row Name 12/11/18 1549          Physical Therapy Goals    Bed Mobility Goal Selection (PT)  bed mobility, PT goal 1  -CW     Transfer Goal Selection (PT)  transfer, PT goal 1  -CW     Gait Training Goal Selection (PT)  gait training, PT goal 1  -CW     Row Name 12/11/18 1549          Bed Mobility Goal 1 (PT)    Activity/Assistive Device (Bed Mobility Goal 1, PT)  rolling to left;rolling to right;sidelying to sit/sit to sidelying;sit to supine/supine to sit  -CW     Story Level/Cues Needed (Bed Mobility Goal 1, PT)  conditional independence  -CW     Time Frame (Bed Mobility Goal 1, PT)  3 days  -CW     Barriers (Bed Mobility Goal 1, PT)  spinal precautions, pain  -CW     Progress/Outcomes (Bed Mobility Goal 1, PT)  goal ongoing  -CW     Row Name 12/11/18 1549          Transfer Goal 1 (PT)    Activity/Assistive Device (Transfer Goal 1, PT)  sit-to-stand/stand-to-sit;toilet;walker, rolling  -CW     Story Level/Cues Needed (Transfer Goal 1, PT)  conditional independence  -CW     Time Frame (Transfer Goal 1, PT)  1 week  -CW     Barriers (Transfers Goal 1, PT)  spinal precautions, pain  -CW     Progress/Outcome (Transfer Goal 1, PT)  goal ongoing  -CW     Row Name 12/11/18 1549          Gait Training Goal 1 (PT)    Activity/Assistive Device (Gait Training Goal 1, PT)  gait (walking locomotion);assistive device use;backward stepping;decrease asymmetrical patterns;decrease fall risk;diminish gait deviation;forward stepping;improve balance and speed;increase endurance/gait distance;increase energy conservation;maintain weight bearing status;sidestepping;turning, left;turning, right;walker, rolling  -CW     Story Level (Gait Training Goal 1, PT)  conditional independence  -CW     Distance (Gait Goal 1, PT)  1x250 ft or more  -CW     Time Frame (Gait Training Goal 1, PT)  1 week  -CW     Barriers (Gait  Training Goal 1, PT)  spinal precautions, pain  -CW     Progress/Outcome (Gait Training Goal 1, PT)  goal ongoing  -CW     Row Name 12/11/18 1549          Positioning and Restraints    Pre-Treatment Position  in bed  -CW     Post Treatment Position  bed  -CW     In Bed  notified nsg;fowlers;call light within reach;encouraged to call for assist;exit alarm on  -CW     Row Name 12/11/18 1549          Living Environment    Home Accessibility  stairs to enter home;wheelchair accessible Patient reports  is paraplegic,home is ADA accessible  -       User Key  (r) = Recorded By, (t) = Taken By, (c) = Cosigned By    Initials Name Provider Type    Cleo Lockett RN Registered Nurse    Ivelisse Cary PT Physical Therapist        Physical Therapy Education     Title: PT OT SLP Therapies (In Progress)     Topic: Physical Therapy (In Progress)     Point: Mobility training (Done)     Learning Progress Summary           Patient Acceptance, E, VU,NR by CW at 12/11/2018  4:45 PM    Comment:  Patient education on role of PT in acute care, use of gait belt, spinal precautions, log rolling techniques, hip hinging for transfers, plan of care, and encouraged to call for assist to reduce risk of falls.                   Point: Body mechanics (Done)     Learning Progress Summary           Patient Acceptance, E, VU,NR by CW at 12/11/2018  4:45 PM    Comment:  Patient education on role of PT in acute care, use of gait belt, spinal precautions, log rolling techniques, hip hinging for transfers, plan of care, and encouraged to call for assist to reduce risk of falls.                   Point: Precautions (Done)     Learning Progress Summary           Patient Acceptance, E, VU,NR by CW at 12/11/2018  4:45 PM    Comment:  Patient education on role of PT in acute care, use of gait belt, spinal precautions, log rolling techniques, hip hinging for transfers, plan of care, and encouraged to call for assist to reduce risk of falls.                                User Key     Initials Effective Dates Name Provider Type Discipline    CW 10/04/18 -  Ivelisse Dey, PT Physical Therapist PT              PT Recommendation and Plan  Anticipated Discharge Disposition (PT): home with 24/7 care, home with assist  Planned Therapy Interventions (PT Eval): balance training, bed mobility training, gait training, home exercise program, postural re-education, patient/family education, stair training, strengthening, transfer training  Therapy Frequency (PT Clinical Impression): 2 times/day  Outcome Summary/Treatment Plan (PT)  Anticipated Equipment Needs at Discharge (PT): front wheeled walker, raised toilet seat  Anticipated Discharge Disposition (PT): home with 24/7 care, home with assist  Plan of Care Reviewed With: patient  Progress: no change  Outcome Summary: PT eval completed this date with patient agreeable to physical therapy and pleasant throughout. Patient educated on spinal precautions and performed log rolling techniques well with min A and verbal cues for rolling (left and right) and sidelying to sit to sidelying. Patient required min A for sit<>stand with FWW, and ambulated 6 steps forward and backwards with CGA and FWW. Pt tolerated evaluation well with improvements in pain levels post repositioning. Patient would benefit from skilled PT to improve functional mobility, balance, endurance, strength, and reduce the risk of falls. D/C recommendation includes home with 24/7 assist and  PT depending on progress made in acute care.   Outcome Measures     Row Name 12/11/18 9936             How much help from another person do you currently need...    Turning from your back to your side while in flat bed without using bedrails?  3  -CW      Moving from lying on back to sitting on the side of a flat bed without bedrails?  3  -CW      Moving to and from a bed to a chair (including a wheelchair)?  3  -CW      Standing up from a chair using your arms  (e.g., wheelchair, bedside chair)?  3  -CW      Climbing 3-5 steps with a railing?  2  -CW      To walk in hospital room?  2  -CW      AM-PAC 6 Clicks Score  16  -CW         Functional Assessment    Outcome Measure Options  AM-PAC 6 Clicks Basic Mobility (PT)  -CW        User Key  (r) = Recorded By, (t) = Taken By, (c) = Cosigned By    Initials Name Provider Type    CW Ivelisse Dey, PT Physical Therapist         Time Calculation:   PT Charges     Row Name 12/11/18 1549             Time Calculation    Start Time  1549  -CW      Stop Time  1622  -CW      Time Calculation (min)  33 min  -CW      PT Received On  12/11/18  -CW      PT Goal Re-Cert Due Date  12/24/18  -CW        User Key  (r) = Recorded By, (t) = Taken By, (c) = Cosigned By    Initials Name Provider Type    CW Ivelisse Dey, PT Physical Therapist        Therapy Suggested Charges     Code   Minutes Charges    None           Therapy Charges for Today     Code Description Service Date Service Provider Modifiers Qty    56759027918 HC PT MOBILITY CURRENT 12/11/2018 Ivelisse Dey, PT GP, CK 1    03237890434 HC PT MOBILITY PROJECTED 12/11/2018 Ivelisse Dey, PT GP, CI 1    38798644093 HC PT EVAL MOD COMPLEXITY 2 12/11/2018 Ivelisse Dey, PT GP 1          PT G-Codes  PT Professional Judgement Used?: Yes  Outcome Measure Options: AM-PAC 6 Clicks Basic Mobility (PT)  AM-PAC 6 Clicks Score: 16  Functional Limitation: Mobility: Walking and moving around  Mobility: Walking and Moving Around Current Status (): At least 40 percent but less than 60 percent impaired, limited or restricted  Mobility: Walking and Moving Around Goal Status (): At least 1 percent but less than 20 percent impaired, limited or restricted      Ivelisse Dey, PT  12/11/2018

## 2018-12-11 NOTE — ANESTHESIA POSTPROCEDURE EVALUATION
Patient: Lashell Zuniga    Procedure Summary     Date:  12/11/18 Room / Location:  [facility] OR 11 / [facility] OR    Anesthesia Start:  0736 Anesthesia Stop:  1208    Procedure:  LATERAL INTERBODY FUSION LUMBAR THREE-FOUR, LUMBAR FOUR-FIVE, WITH INSTRUMENTATION,INFUSE BONE GRAFT         neurotC-ARM#3) (N/A Spine Lumbar) Diagnosis:       Back pain, unspecified back location, unspecified back pain laterality, unspecified chronicity      DDD (degenerative disc disease), lumbar      (Back pain, unspecified back location, unspecified back pain laterality, unspecified chronicity [M54.9])      (DDD (degenerative disc disease), lumbar [M51.36])    Surgeon:  Matt Salcedo MD Provider:  Cyril Vazquez MD    Anesthesia Type:  general ASA Status:  3          Anesthesia Type: general  Last vitals  BP   134/93 (12/11/18 0620)   Temp   98.3 °F (36.8 °C) (12/11/18 0620)   Pulse   76 (12/11/18 0620)   Resp   18 (12/11/18 0620)     SpO2   100 % (12/11/18 0620)     Post Anesthesia Care and Evaluation    Patient location during evaluation: PACU  Patient participation: waiting for patient participation  Level of consciousness: obtunded/minimal responses  Pain score: 0  Pain management: adequate  Airway patency: patent  Anesthetic complications: No anesthetic complications  PONV Status: none  Cardiovascular status: acceptable  Respiratory status: acceptable and oral airway  Hydration status: acceptable

## 2018-12-11 NOTE — ANESTHESIA PREPROCEDURE EVALUATION
Anesthesia Evaluation     no history of anesthetic complications:  NPO Solid Status: > 8 hours  NPO Liquid Status: > 2 hours           Airway   Mallampati: II  TM distance: >3 FB  Neck ROM: full  Possible difficult intubation  Dental    (+) lower dentures and upper dentures    Pulmonary - normal exam    breath sounds clear to auscultation  (+) asthma, sleep apnea,   (-) not a smoker  Cardiovascular - normal exam  Exercise tolerance: poor (<4 METS)    ECG reviewed  Rhythm: regular  Rate: normal    (+) hypertension well controlled less than 2 medications,   (-) valvular problems/murmurs, past MI, dysrhythmias, angina, cardiac stents, PVD, DVT, hyperlipidemia    ROS comment: Sinus bradycardia with premature atrial complexes  Otherwise normal ECG  No previous ECGs available    Neuro/Psych  (-) seizures, CVA, headaches, psychiatric history  GI/Hepatic/Renal/Endo    (+) obesity,  GERD well controlled,    (-) hepatitis, liver disease, no renal disease, diabetes, hypothyroidism    Musculoskeletal     (+) back pain, chronic pain, radiculopathy Left lower extremity      ROS comment: Back pain 5/10  Radiculopathy down Left side of leg to toes  Worse with activity as well as rest  Abdominal   (+) obese,    Substance History - negative use     OB/GYN negative ob/gyn ROS   (-)  Pregnant        Other   (+) arthritis     (-) history of cancer      Phys Exam Other: Bruise on right cheek from recent fall                Anesthesia Plan    ASA 3     general   (Arterial line, prone positioning discussed)  intravenous induction   Anesthetic plan, all risks, benefits, and alternatives have been provided, discussed and informed consent has been obtained with: patient and spouse/significant other.  Use of blood products discussed with patient  Consented to blood products.

## 2018-12-11 NOTE — ANESTHESIA PROCEDURE NOTES
ANESTHESIA INTUBATION  Urgency: elective    Airway not difficult    General Information and Staff    Patient location during procedure: OR  Anesthesiologist: Cyril Vazquez MD  CRNA: Lyle Hargrove CRNA    Indications and Patient Condition  Indications for airway management: airway protection    Preoxygenated: yes  MILS maintained throughout  Mask difficulty assessment: 1 - vent by mask    Final Airway Details  Final airway type: endotracheal airway      Successful airway: ETT  Cuffed: yes   Successful intubation technique: direct laryngoscopy  Facilitating devices/methods: intubating stylet  Endotracheal tube insertion site: oral  Blade: Andrea  Blade size: 3

## 2018-12-11 NOTE — PLAN OF CARE
Problem: Patient Care Overview  Goal: Plan of Care Review  Outcome: Ongoing (interventions implemented as appropriate)   12/11/18 1401   Coping/Psychosocial   Plan of Care Reviewed With patient   Plan of Care Review   Progress no change   OTHER   Outcome Summary new admit. vss. drowsy.        Problem: Fall Risk (Adult)  Goal: Identify Related Risk Factors and Signs and Symptoms  Outcome: Outcome(s) achieved Date Met: 12/11/18    Goal: Absence of Fall  Outcome: Ongoing (interventions implemented as appropriate)      Problem: Laminectomy/Foraminotomy/Discectomy (Adult)  Goal: Signs and Symptoms of Listed Potential Problems Will be Absent, Minimized or Managed (Laminectomy/Foraminotomy/Discectomy)  Outcome: Ongoing (interventions implemented as appropriate)    Goal: Anesthesia/Sedation Recovery  Outcome: Outcome(s) achieved Date Met: 12/11/18      Problem: Diabetes, Type 2 (Adult)  Goal: Signs and Symptoms of Listed Potential Problems Will be Absent, Minimized or Managed (Diabetes, Type 2)  Outcome: Ongoing (interventions implemented as appropriate)

## 2018-12-11 NOTE — ANESTHESIA PROCEDURE NOTES
Arterial Line    Pre-sedation assessment completed: 12/11/2018 7:08 AM    Patient location during procedure: OR  Start time: 12/11/2018 7:50 AM  Stop Time:12/11/2018 8:00 AM       Line placed for hemodynamic monitoring.  Performed By   Anesthesiologist: Cyril Vazquez MD  CRNA: Lyle Hargrove CRNA  Preanesthetic Checklist  Completed: patient identified, site marked, surgical consent, pre-op evaluation, timeout performed, IV checked, risks and benefits discussed and monitors and equipment checked  Arterial Line Prep   Sterile Tech: cap, gloves, mask and sterile barriers  Prep: ChloraPrep  Patient monitoring: EKG, continuous pulse oximetry and blood pressure monitoring  Arterial Line Procedure   Laterality:right  Location:  radial artery  Catheter size: 20 G   Guidance: palpation technique  Number of attempts: 1  Successful placement: yes          Post Assessment   Dressing Type: occlusive dressing applied and secured with tape.   Complications no  Circ/Move/Sens Assessment: normal.

## 2018-12-12 LAB
GLUCOSE BLDC GLUCOMTR-MCNC: 152 MG/DL (ref 70–130)
GLUCOSE BLDC GLUCOMTR-MCNC: 89 MG/DL (ref 70–130)
GLUCOSE BLDC GLUCOMTR-MCNC: 98 MG/DL (ref 70–130)
HCT VFR BLD AUTO: 31.5 % (ref 35–45)
HGB BLD-MCNC: 11 G/DL (ref 12–15.5)

## 2018-12-12 PROCEDURE — 85018 HEMOGLOBIN: CPT | Performed by: ORTHOPAEDIC SURGERY

## 2018-12-12 PROCEDURE — 25010000002 CEFAZOLIN PER 500 MG: Performed by: ORTHOPAEDIC SURGERY

## 2018-12-12 PROCEDURE — 97116 GAIT TRAINING THERAPY: CPT

## 2018-12-12 PROCEDURE — 82962 GLUCOSE BLOOD TEST: CPT

## 2018-12-12 PROCEDURE — 85014 HEMATOCRIT: CPT | Performed by: ORTHOPAEDIC SURGERY

## 2018-12-12 PROCEDURE — 97530 THERAPEUTIC ACTIVITIES: CPT

## 2018-12-12 PROCEDURE — 25810000003 SODIUM CHLORIDE 0.9 % WITH KCL 20 MEQ 20-0.9 MEQ/L-% SOLUTION: Performed by: ORTHOPAEDIC SURGERY

## 2018-12-12 PROCEDURE — 25010000002 METHOCARBAMOL 1000 MG/10ML SOLUTION 10 ML VIAL: Performed by: ORTHOPAEDIC SURGERY

## 2018-12-12 PROCEDURE — 63710000001 INSULIN ASPART PER 5 UNITS: Performed by: ORTHOPAEDIC SURGERY

## 2018-12-12 RX ADMIN — OXYCODONE HYDROCHLORIDE AND ACETAMINOPHEN 1 TABLET: 7.5; 325 TABLET ORAL at 17:38

## 2018-12-12 RX ADMIN — DOCUSATE SODIUM 100 MG: 100 CAPSULE, LIQUID FILLED ORAL at 20:06

## 2018-12-12 RX ADMIN — SODIUM CHLORIDE 2 G: 9 INJECTION, SOLUTION INTRAVENOUS at 08:34

## 2018-12-12 RX ADMIN — DOCUSATE SODIUM 100 MG: 100 CAPSULE, LIQUID FILLED ORAL at 08:36

## 2018-12-12 RX ADMIN — Medication 12.5 MG: at 08:36

## 2018-12-12 RX ADMIN — FAMOTIDINE 20 MG: 20 TABLET ORAL at 08:36

## 2018-12-12 RX ADMIN — METHOCARBAMOL 1000 MG: 100 INJECTION INTRAMUSCULAR; INTRAVENOUS at 05:25

## 2018-12-12 RX ADMIN — INSULIN ASPART 4 UNITS: 100 INJECTION, SOLUTION INTRAVENOUS; SUBCUTANEOUS at 17:38

## 2018-12-12 RX ADMIN — OXYCODONE HYDROCHLORIDE AND ACETAMINOPHEN 1 TABLET: 7.5; 325 TABLET ORAL at 07:22

## 2018-12-12 RX ADMIN — OXYCODONE HYDROCHLORIDE AND ACETAMINOPHEN 1 TABLET: 7.5; 325 TABLET ORAL at 13:11

## 2018-12-12 RX ADMIN — POTASSIUM CHLORIDE AND SODIUM CHLORIDE 100 ML/HR: 900; 150 INJECTION, SOLUTION INTRAVENOUS at 06:50

## 2018-12-12 RX ADMIN — OXYCODONE HYDROCHLORIDE AND ACETAMINOPHEN 1 TABLET: 7.5; 325 TABLET ORAL at 02:50

## 2018-12-12 RX ADMIN — OXYCODONE HYDROCHLORIDE AND ACETAMINOPHEN 1 TABLET: 7.5; 325 TABLET ORAL at 21:36

## 2018-12-12 RX ADMIN — SODIUM CHLORIDE 2 G: 9 INJECTION, SOLUTION INTRAVENOUS at 00:43

## 2018-12-12 NOTE — THERAPY TREATMENT NOTE
Acute Care - Physical Therapy Treatment Note  [facility]     Patient Name: Lashell Zuniga  : 1965  MRN: [id number]  Today's Date: 2018  Onset of Illness/Injury or Date of Surgery: 18  Date of Referral to PT: 18  Referring Physician: Matt Salcedo MD    Admit Date: 2018    Visit Dx:    ICD-10-CM ICD-9-CM   1. Impaired functional mobility, balance, gait, and endurance Z74.09 V49.89   2. Back pain, unspecified back location, unspecified back pain laterality, unspecified chronicity M54.9 724.5   3. DDD (degenerative disc disease), lumbar M51.36 722.52     Patient Active Problem List   Diagnosis   • Back pain   • DDD (degenerative disc disease), lumbar       Therapy Treatment    Rehabilitation Treatment Summary     Row Name 18 1500 18 0832          Treatment Time/Intention    Discipline  physical therapy assistant  -TEZ  physical therapy assistant  -TEZ     Document Type  therapy note (daily note)  -TEZ  therapy note (daily note)  -TEZ     Mode of Treatment  physical therapy;individual therapy  -TEZ  physical therapy;individual therapy  -TEZ     Therapy Frequency (PT Clinical Impression)  2 times/day  -TEZ  2 times/day  -TEZ     Patient Effort  good  -TEZ  good  -TEZ     Existing Precautions/Restrictions  fall;spinal  -TEZ  fall;spinal  -TEZ     Recorded by [TEZ] Pavel Luna, PTA 18 1534 [TEZ] Pavel Luna, PTA 18 1210     Row Name 18 1500 18 0832          Vital Signs    Pre Systolic BP Rehab  104  -TEZ  113  -TEZ     Pre Treatment Diastolic BP  65  -TEZ  64  -TEZ     Intra Systolic BP Rehab  --  123  -TEZ     Intra Treatment Diastolic BP  --  78  -TEZ     Post Systolic BP Rehab  117  -TEZ  117  -TEZ     Post Treatment Diastolic BP  65  -TEZ  65  -TEZ     Pretreatment Heart Rate (beats/min)  112 resting HR. nsg states to continue w/ tx.   -TEZ  100  -TEZ     Posttreatment Heart Rate (beats/min)  108  -TEZ  96  -TEZ     Pre SpO2 (%)  96  -TEZ  96  -TEZ      O2 Delivery Pre Treatment  room air  -TEZ  room air  -TEZ     Post SpO2 (%)  97  -TEZ  96  -TEZ     O2 Delivery Post Treatment  room air  -TEZ  room air  -TEZ     Pre Patient Position  Supine  -TEZ  Side Lying  -TEZ     Post Patient Position  Supine  -TEZ  Supine  -TEZ     Recorded by [TEZ] Pavel Luna, PTA 12/12/18 1534 [TEZ] Pavel Luna, PTA 12/12/18 1210     Row Name 12/12/18 1500 12/12/18 0832          Cognitive Assessment/Intervention- PT/OT    Affect/Mental Status (Cognitive)  WNL  -TEZ  WNL  -TEZ     Orientation Status (Cognition)  oriented x 4  -TEZ  oriented x 4  -TEZ     Follows Commands (Cognition)  follows one step commands;over 90% accuracy  -TEZ  follows one step commands;over 90% accuracy  -TEZ     Personal Safety Interventions  gait belt;muscle strengthening facilitated;nonskid shoes/slippers when out of bed;supervised activity  -JC2  gait belt;muscle strengthening facilitated;nonskid shoes/slippers when out of bed;supervised activity  -TEZ     Recorded by [TEZ] Pavel Luna, PTA 12/12/18 1534  [JC2] Pavel Luna, PTA 12/12/18 1539 [TEZ] Pavel Luna, PTA 12/12/18 1210     Row Name 12/12/18 1500 12/12/18 0832          Safety Issues, Functional Mobility    Impairments Affecting Function (Mobility)  coordination;balance;pain;postural/trunk control;strength;endurance/activity tolerance  -TEZ  coordination;balance;pain;postural/trunk control;strength;endurance/activity tolerance  -TEZ     Recorded by [TEZ] Pavel Luna, PTA 12/12/18 1534 [TEZ] Pavel Luna, PTA 12/12/18 1210     Row Name 12/12/18 1500 12/12/18 0832          Bed Mobility Assessment/Treatment    Bed Mobility Assessment/Treatment  rolling right;sidelying-sit;rolling left;sit-sidelying  -TEZ  rolling right;sidelying-sit  -TEZ     Rolling Left [place] (Bed Mobility)  supervision;verbal cues  -TEZ  --  -TEZ     Rolling Right [place] (Bed Mobility)  supervision;verbal cues  -TEZ  minimum assist (75% patient effort)  -TEZ      Sidelying-Sit [place] (Bed Mobility)  supervision;verbal cues  -TEZ  minimum assist (75% patient effort)  -TEZ     Sit-Sidelying [place] (Bed Mobility)  supervision;verbal cues  -TEZ  --  -TEZ     Bed Mobility, Safety Issues  decreased use of legs for bridging/pushing;decreased use of arms for pushing/pulling;impaired trunk control for bed mobility  -JC2  decreased use of legs for bridging/pushing;decreased use of arms for pushing/pulling;impaired trunk control for bed mobility  -TEZ     Assistive Device (Bed Mobility)  bed rails  -JC2  bed rails  -TEZ     Comment (Bed Mobility)  log rolling improving. verbal cueing for correct form.   -TEZ  --     Recorded by [TEZ] Pavel Luna, PTA 12/12/18 1539  [JC2] Pavel Luna, PTA 12/12/18 1534 [TEZ] Pavel Luna, PTA 12/12/18 1210     Row Name 12/12/18 1500 12/12/18 0832          Transfer Assessment/Treatment    Transfer Assessment/Treatment  sit-stand transfer;stand-sit transfer  -TEZ  sit-stand transfer;stand-sit transfer;bed-chair transfer  -TEZ     Recorded by [TEZ] Pavel Luna, PTA 12/12/18 1539 [TEZ] Pavel Luna, PTA 12/12/18 1210     Row Name 12/12/18 1500 12/12/18 0832          Bed-Chair Transfer    Bed-Chair [place] (Transfers)  --  -TEZ  contact guard  -TEZ     Assistive Device (Bed-Chair Transfers)  --  -TEZ  walker, front-wheeled  -TEZ     Recorded by [TEZ] Pavel Luna, PTA 12/12/18 1539 [TEZ] Pavel Luna, PTA 12/12/18 1210     Row Name 12/12/18 1500 12/12/18 0832          Sit-Stand Transfer    Sit-Stand [place] (Transfers)  supervision  -TEZ  contact guard  -TEZ     Assistive Device (Sit-Stand Transfers)  walker, front-wheeled  -JC2  walker, front-wheeled  -TEZ     Recorded by [TEZ] Pavel Luna, PTA 12/12/18 1539  [JC2] Pavel Luna, PTA 12/12/18 1534 [TEZ] Pavel Luna, PTA 12/12/18 1210     Row Name 12/12/18 1500 12/12/18 0832          Stand-Sit Transfer    Stand-Sit [place] (Transfers)  supervision  -  contact  guard  -TEZ     Assistive Device (Stand-Sit Transfers)  walker, front-wheeled  -JC2  walker, front-wheeled  -TEZ     Recorded by [TEZ] Pavel Luna, PTA 12/12/18 1539  [JC2] Pavel Luna, PTA 12/12/18 1534 [TEZ] Pavel Luna, PTA 12/12/18 1210     Row Name 12/12/18 1500 12/12/18 0832          Gait/Stairs Assessment/Training    Gait/Stairs Assessment/Training  gait/ambulation independence;gait/ambulation assistive device;distance ambulated;gait pattern;gait deviations  -TEZ  gait/ambulation independence;gait/ambulation assistive device;distance ambulated;gait pattern;gait deviations  -TEZ     [place] Level (Gait)  supervision  -JC2  contact guard  -TEZ     Assistive Device (Gait)  walker, front-wheeled  -TEZ  walker, front-wheeled  -TEZ     Distance in Feet (Gait)  370  -JC2  134  -TEZ     Pattern (Gait)  step-to  -TEZ  step-to  -TEZ     Deviations/Abnormal Patterns (Gait)  stride length decreased;gait speed decreased pt appears steady.   -JC2  stride length decreased;gait speed decreased;base of support, wide  -TEZ     Recorded by [TEZ] Pavel Luna, PTA 12/12/18 1534  [JC2] Pavel Luna, PTA 12/12/18 1539 [TEZ] Pavel Luna, PTA 12/12/18 1210     Row Name 12/12/18 1500 12/12/18 0832          Positioning and Restraints    Pre-Treatment Position  in bed  -TEZ  in bed  -TEZ     Post Treatment Position  bed  -TEZ  chair  -TEZ     In Bed  supine;call light within reach;encouraged to call for assist;exit alarm on all needs met.   -TEZ  --     In Chair  --  reclined;call light within reach;encouraged to call for assist;exit alarm on all needs met  -TEZ     Recorded by [TEZ] Pavel Luna, PTA 12/12/18 1539 [TEZ] Pavel Luna, PTA 12/12/18 1210     Row Name 12/12/18 0832             Pain Assessment    Additional Documentation  Pain Scale: Numbers Pre/Post-Treatment (Group)  -TEZ      Recorded by [TEZ] Pavel Luna, PTA 12/12/18 1210      Row Name 12/12/18 1500 12/12/18 0832          Pain Scale: Numbers  Pre/Post-Treatment    Pain Scale: Numbers, Pretreatment  2/10  -TEZ  5/10  -TEZ     Pain Scale: Numbers, Post-Treatment  2/10  -TEZ  6/10  -TEZ     Pain Location - Orientation  lower  -JC2  lower  -TEZ     Pain Location  back  -JC2  back  -TEZ     Pain Intervention(s)  Cold applied;Ambulation/increased activity pt states she feels better after gait.   -TEZ  Repositioned pain meds prior to tx.   -TEZ     Recorded by [TEZ] Pavel Luna, PTA 12/12/18 1539  [JC2] Pavel Luna, PTA 12/12/18 1534 [TEZ] Pavel Luna, PTA 12/12/18 1210     Row Name 12/12/18 0832             Sensory Assessment/Intervention    Sensory General Assessment  --  -TEZ      Recorded by [TEZ] Pavel Luna, PTA 12/12/18 1210      Row Name 12/12/18 0832             Vision Assessment/Intervention    Visual Impairment/Limitations  --  -TEZ      Recorded by [TEZ] Pavel Luna, PTA 12/12/18 1210      Row Name                Wound 12/11/18 1145 Left incision;surgical    Wound - Properties Group Date first assessed: 12/11/18 [KW] Time first assessed: 1145 [KW] Side: Left [KW] Type: incision;surgical [KW] Recorded by:  [KW] Cleo Dobbs RN 12/11/18 1204    Row Name 12/12/18 1500 12/12/18 0832          Coping    Observed Emotional State  calm;cooperative;pleasant  -TEZ  calm;cooperative;pleasant  -TEZ     Recorded by [TEZ] Pavel Luna, PTA 12/12/18 1534 [TEZ] Pavel Luna, PTA 12/12/18 1210     Row Name 12/12/18 1500 12/12/18 0832          Outcome Summary/Treatment Plan (PT)    Daily Summary of Progress (PT)  progress toward functional goals as expected  -TEZ  progress toward functional goals as expected  -TEZ     Anticipated Equipment Needs at Discharge (PT)  front wheeled walker;raised toilet seat  -JC2  front wheeled walker;raised toilet seat  -TEZ     Anticipated Discharge Disposition (PT)  home with 24/7 care;home with assist  -JC2  home with 24/7 care;home with assist  -TEZ     Recorded by [TEZ] Pavel Luna, PTA 12/12/18 1539  [JC2]  Pavel Luna, PTA 12/12/18 1534 [TEZ] Pavel Luna, PTA 12/12/18 1210       User Key  (r) = Recorded By, (t) = Taken By, (c) = Cosigned By    Initials Name Effective Dates Discipline    TEZ Pavel Luna, PTA 03/07/18 -  PT    KW Cleo Dobbs RN 10/17/16 -  Nurse          Wound 12/11/18 1145 Left incision;surgical (Active)   Closure Open to air;Adhesive closure strips 12/12/2018  8:45 AM   Drainage Amount none 12/12/2018  8:45 AM       Rehab Goal Summary     Row Name 12/12/18 1500 12/12/18 0832          Physical Therapy Goals    Bed Mobility Goal Selection (PT)  bed mobility, PT goal 1  -TEZ  bed mobility, PT goal 1  -TEZ     Transfer Goal Selection (PT)  transfer, PT goal 1  -TEZ  transfer, PT goal 1  -TEZ     Gait Training Goal Selection (PT)  gait training, PT goal 1  -TEZ  gait training, PT goal 1  -TZE        Bed Mobility Goal 1 (PT)    Activity/Assistive Device (Bed Mobility Goal 1, PT)  rolling to left;rolling to right;sidelying to sit/sit to sidelying;sit to supine/supine to sit  -TEZ  rolling to left;rolling to right;sidelying to sit/sit to sidelying;sit to supine/supine to sit  -TEZ     [place] Level/Cues Needed (Bed Mobility Goal 1, PT)  conditional independence  -TEZ  conditional independence  -TEZ     Time Frame (Bed Mobility Goal 1, PT)  3 days  -TEZ  3 days  -TEZ     Barriers (Bed Mobility Goal 1, PT)  spinal precautions, pain  -TEZ  spinal precautions, pain  -TEZ     Progress/Outcomes (Bed Mobility Goal 1, PT)  goal ongoing  -TZE  goal ongoing  -TEZ        Transfer Goal 1 (PT)    Activity/Assistive Device (Transfer Goal 1, PT)  sit-to-stand/stand-to-sit;toilet;walker, rolling  -TEZ  sit-to-stand/stand-to-sit;toilet;walker, rolling  -TEZ     [place] Level/Cues Needed (Transfer Goal 1, PT)  conditional independence  -TEZ  conditional independence  -TEZ     Time Frame (Transfer Goal 1, PT)  1 week  -TEZ  1 week  -TEZ     Barriers (Transfers Goal 1, PT)  spinal precautions, pain  -TEZ  spinal  precautions, pain  -TEZ     Progress/Outcome (Transfer Goal 1, PT)  goal ongoing  -TEZ  goal ongoing  -TEZ        Gait Training Goal 1 (PT)    Activity/Assistive Device (Gait Training Goal 1, PT)  gait (walking locomotion);assistive device use;backward stepping;decrease asymmetrical patterns;decrease fall risk;diminish gait deviation;forward stepping;improve balance and speed;increase endurance/gait distance;increase energy conservation;maintain weight bearing status;sidestepping;turning, left;turning, right;walker, rolling  -TEZ  gait (walking locomotion);assistive device use;backward stepping;decrease asymmetrical patterns;decrease fall risk;diminish gait deviation;forward stepping;improve balance and speed;increase endurance/gait distance;increase energy conservation;maintain weight bearing status;sidestepping;turning, left;turning, right;walker, rolling  -TEZ     [place] Level (Gait Training Goal 1, PT)  conditional independence  -TEZ  conditional independence  -TEZ     Distance (Gait Goal 1, PT)  1x250 ft or more  -TEZ  1x250 ft or more  -TEZ     Time Frame (Gait Training Goal 1, PT)  1 week  -TEZ  1 week  -TEZ     Barriers (Gait Training Goal 1, PT)  spinal precautions, pain  -TEZ  spinal precautions, pain  -TEZ     Progress/Outcome (Gait Training Goal 1, PT)  goal ongoing  -TEZ  goal ongoing  -TEZ       User Key  (r) = Recorded By, (t) = Taken By, (c) = Cosigned By    Initials Name Provider Type Discipline    Pavel Antony, PTA Physical Therapy Assistant PT          Physical Therapy Education     Title: PT OT SLP Therapies (In Progress)     Topic: Physical Therapy (In Progress)     Point: Mobility training (Done)     Learning Progress Summary           Patient Acceptance, E, VU,NR by CW at 12/11/2018  4:45 PM    Comment:  Patient education on role of PT in acute care, use of gait belt, spinal precautions, log rolling techniques, hip hinging for transfers, plan of care, and encouraged to call for assist to reduce  risk of falls.                   Point: Body mechanics (Done)     Learning Progress Summary           Patient Acceptance, E, VU,NR by CW at 12/11/2018  4:45 PM    Comment:  Patient education on role of PT in acute care, use of gait belt, spinal precautions, log rolling techniques, hip hinging for transfers, plan of care, and encouraged to call for assist to reduce risk of falls.                   Point: Precautions (Done)     Learning Progress Summary           Patient Acceptance, E,TB,D, VU,DU by TEZ at 12/12/2018  3:40 PM    Comment:  pt able to recal 3/3 spine precautions. log rolling reinforced.    Acceptance, E, VU,DU by TEZ at 12/12/2018 12:11 PM    Comment:  pt edu on log rolling and spinal prec.    Acceptance, E, VU,NR by CW at 12/11/2018  4:45 PM    Comment:  Patient education on role of PT in acute care, use of gait belt, spinal precautions, log rolling techniques, hip hinging for transfers, plan of care, and encouraged to call for assist to reduce risk of falls.                               User Key     Initials Effective Dates Name Provider Type Discipline    TEZ 03/07/18 -  Pavel Luna, PTA Physical Therapy Assistant PT     10/04/18 -  Ivelisse Dey PT Physical Therapist PT                PT Recommendation and Plan  Anticipated Discharge Disposition (PT): home with 24/7 care, home with assist  Therapy Frequency (PT Clinical Impression): 2 times/day  Outcome Summary/Treatment Plan (PT)  Daily Summary of Progress (PT): progress toward functional goals as expected  Anticipated Equipment Needs at Discharge (PT): front wheeled walker, raised toilet seat  Anticipated Discharge Disposition (PT): home with 24/7 care, home with assist  Plan of Care Reviewed With: patient  Progress: improving  Outcome Summary: AM and PM tx completed. pt responded well to therapy w/ increased gait to 370 ft SBA w/ RW. pt edu on log rolling and spine precautions. pt is pleasant and cooperative. Recommend HH vs OP PT upon  ELLEN.   Outcome Measures     Row Name 12/12/18 0832 12/11/18 1549          How much help from another person do you currently need...    Turning from your back to your side while in flat bed without using bedrails?  3  -TEZ  3  -CW     Moving from lying on back to sitting on the side of a flat bed without bedrails?  3  -TEZ  3  -CW     Moving to and from a bed to a chair (including a wheelchair)?  3  -TEZ  3  -CW     Standing up from a chair using your arms (e.g., wheelchair, bedside chair)?  3  -TEZ  3  -CW     Climbing 3-5 steps with a railing?  2  -TEZ  2  -CW     To walk in hospital room?  3  -TEZ  2  -CW     AM-PAC 6 Clicks Score  17  -TEZ  16  -CW        Functional Assessment    Outcome Measure Options  AM-PAC 6 Clicks Basic Mobility (PT)  -TEZ  AM-PAC 6 Clicks Basic Mobility (PT)  -CW       User Key  (r) = Recorded By, (t) = Taken By, (c) = Cosigned By    Initials Name Provider Type    Pavel Antony PTA Physical Therapy Assistant    CW Ivelisse Dey, PT Physical Therapist         Time Calculation:   PT Charges     Row Name 12/12/18 1542 12/12/18 1211          Time Calculation    Start Time  1500  -TEZ  0832  -TEZ     Stop Time  1527  -TEZ  0901  -TEZ     Time Calculation (min)  27 min  -TEZ  29 min  -TEZ        Time Calculation- PT    Total Timed Code Minutes- PT  27 minute(s)  -TEZ  29 minute(s)  -TEZ       User Key  (r) = Recorded By, (t) = Taken By, (c) = Cosigned By    Initials Name Provider Type    TEZ Pavel Luna PTA Physical Therapy Assistant        Therapy Suggested Charges     Code   Minutes Charges    None           Therapy Charges for Today     Code Description Service Date Service Provider Modifiers Qty    01686092577 HC GAIT TRAINING EA 15 MIN 12/12/2018 Pavel Luna, TAVARES GP 1    00117559301 HC PT THERAPEUTIC ACT EA 15 MIN 12/12/2018 Pavel Luna PTA GP 1    88471890411 HC GAIT TRAINING EA 15 MIN 12/12/2018 Pavel Luna PTA GP 1    18276079432 HC PT THERAPEUTIC ACT EA 15 MIN  12/12/2018 Pavel Luna, PTA GP 1          PT G-Codes  PT Professional Judgement Used?: Yes  Outcome Measure Options: AM-PAC 6 Clicks Basic Mobility (PT)  AM-PAC 6 Clicks Score: 17  Functional Limitation: Mobility: Walking and moving around  Mobility: Walking and Moving Around Current Status (): At least 40 percent but less than 60 percent impaired, limited or restricted  Mobility: Walking and Moving Around Goal Status (): At least 1 percent but less than 20 percent impaired, limited or restricted    Pvael Luna PTA  12/12/2018

## 2018-12-12 NOTE — PROGRESS NOTES
Pain controlled  Vitals:    12/12/18 0347   BP: 94/49   Pulse: 98   Resp: 18   Temp: 100.4 °F (38 °C)   SpO2: 92%     Hb 11.5 hct 31  Alert  Incision dry  No erythema  5/ 5 right and left hip flexor quadricep tibialis anterior extensor hallucis longus and gastrocnemius  Pulse present right and left legs  Calf soft  Brisk capillary refill  Foot warm and dry  Ambulate  Remove catheter.  Blood sugar poorly controlled.

## 2018-12-12 NOTE — PLAN OF CARE
Problem: Patient Care Overview  Goal: Plan of Care Review  Outcome: Ongoing (interventions implemented as appropriate)   12/12/18 1500   Coping/Psychosocial   Plan of Care Reviewed With patient   Plan of Care Review   Progress improving   OTHER   Outcome Summary AM and PM tx completed. pt responded well to therapy w/ increased gait to 370 ft SBA w/ RW. pt edu on log rolling and spine precautions. pt is pleasant and cooperative. Recommend HH vs OP PT upon DC.

## 2018-12-12 NOTE — PLAN OF CARE
Problem: Patient Care Overview  Goal: Plan of Care Review  Outcome: Ongoing (interventions implemented as appropriate)   12/12/18 3861   Coping/Psychosocial   Plan of Care Reviewed With patient   Plan of Care Review   Progress improving   OTHER   Outcome Summary Pt. VS stable; pain managed with PO pain medication     Goal: Individualization and Mutuality  Outcome: Ongoing (interventions implemented as appropriate)    Goal: Discharge Needs Assessment  Outcome: Ongoing (interventions implemented as appropriate)    Goal: Interprofessional Rounds/Family Conf  Outcome: Ongoing (interventions implemented as appropriate)      Problem: Fall Risk (Adult)  Goal: Absence of Fall  Outcome: Ongoing (interventions implemented as appropriate)      Problem: Laminectomy/Foraminotomy/Discectomy (Adult)  Goal: Signs and Symptoms of Listed Potential Problems Will be Absent, Minimized or Managed (Laminectomy/Foraminotomy/Discectomy)  Outcome: Ongoing (interventions implemented as appropriate)      Problem: Diabetes, Type 2 (Adult)  Goal: Signs and Symptoms of Listed Potential Problems Will be Absent, Minimized or Managed (Diabetes, Type 2)  Outcome: Ongoing (interventions implemented as appropriate)

## 2018-12-13 VITALS
SYSTOLIC BLOOD PRESSURE: 118 MMHG | HEART RATE: 99 BPM | OXYGEN SATURATION: 96 % | BODY MASS INDEX: 35.52 KG/M2 | TEMPERATURE: 97.5 F | RESPIRATION RATE: 18 BRPM | DIASTOLIC BLOOD PRESSURE: 70 MMHG | WEIGHT: 213.19 LBS | HEIGHT: 65 IN

## 2018-12-13 LAB
GLUCOSE BLDC GLUCOMTR-MCNC: 105 MG/DL (ref 70–130)
GLUCOSE BLDC GLUCOMTR-MCNC: 113 MG/DL (ref 70–130)
GLUCOSE BLDC GLUCOMTR-MCNC: 126 MG/DL (ref 70–130)
HCT VFR BLD AUTO: 31.9 % (ref 35–45)
HGB BLD-MCNC: 10.9 G/DL (ref 12–15.5)
HOLD SPECIMEN: NORMAL

## 2018-12-13 PROCEDURE — 85018 HEMOGLOBIN: CPT | Performed by: ORTHOPAEDIC SURGERY

## 2018-12-13 PROCEDURE — 97116 GAIT TRAINING THERAPY: CPT

## 2018-12-13 PROCEDURE — 97535 SELF CARE MNGMENT TRAINING: CPT

## 2018-12-13 PROCEDURE — 97110 THERAPEUTIC EXERCISES: CPT

## 2018-12-13 PROCEDURE — 85014 HEMATOCRIT: CPT | Performed by: ORTHOPAEDIC SURGERY

## 2018-12-13 PROCEDURE — 82962 GLUCOSE BLOOD TEST: CPT

## 2018-12-13 RX ORDER — METHOCARBAMOL 750 MG/1
750 TABLET, FILM COATED ORAL 4 TIMES DAILY PRN
Qty: 80 TABLET | Refills: 1 | Status: SHIPPED | OUTPATIENT
Start: 2018-12-13 | End: 2019-01-09 | Stop reason: SDUPTHER

## 2018-12-13 RX ORDER — OXYCODONE AND ACETAMINOPHEN 7.5; 325 MG/1; MG/1
1 TABLET ORAL EVERY 6 HOURS PRN
Qty: 80 TABLET | Refills: 0 | Status: SHIPPED | OUTPATIENT
Start: 2018-12-13 | End: 2019-01-09 | Stop reason: SDUPTHER

## 2018-12-13 RX ORDER — DOCUSATE SODIUM 100 MG/1
100 CAPSULE, LIQUID FILLED ORAL 2 TIMES DAILY
Qty: 30 CAPSULE | Refills: 0 | Status: SHIPPED | OUTPATIENT
Start: 2018-12-13

## 2018-12-13 RX ADMIN — OXYCODONE HYDROCHLORIDE AND ACETAMINOPHEN 1 TABLET: 7.5; 325 TABLET ORAL at 07:23

## 2018-12-13 RX ADMIN — FAMOTIDINE 20 MG: 20 TABLET ORAL at 08:06

## 2018-12-13 RX ADMIN — Medication 12.5 MG: at 08:06

## 2018-12-13 RX ADMIN — OXYCODONE HYDROCHLORIDE AND ACETAMINOPHEN 1 TABLET: 7.5; 325 TABLET ORAL at 01:42

## 2018-12-13 RX ADMIN — DOCUSATE SODIUM 100 MG: 100 CAPSULE, LIQUID FILLED ORAL at 08:06

## 2018-12-13 RX ADMIN — OXYCODONE HYDROCHLORIDE AND ACETAMINOPHEN 1 TABLET: 7.5; 325 TABLET ORAL at 12:00

## 2018-12-13 NOTE — THERAPY DISCHARGE NOTE
Acute Care - Physical Therapy Discharge Summary  [facility]       Patient Name: Lashell Zuniga  : 1965  MRN: [id number]    Today's Date: 2018  Onset of Illness/Injury or Date of Surgery: 18    Date of Referral to PT: 18  Referring Physician: Matt Salcedo MD      Admit Date: 2018      PT Recommendation and Plan    Visit Dx:    ICD-10-CM ICD-9-CM   1. Impaired functional mobility, balance, gait, and endurance Z74.09 V49.89   2. Back pain, unspecified back location, unspecified back pain laterality, unspecified chronicity M54.9 724.5   3. DDD (degenerative disc disease), lumbar M51.36 722.52       Outcome Measures     Row Name 18 0834 18 0832 18 1549       How much help from another person do you currently need...    Turning from your back to your side while in flat bed without using bedrails?  3  -  3  -  3  -CW    Moving from lying on back to sitting on the side of a flat bed without bedrails?  3  -  3  -  3  -CW    Moving to and from a bed to a chair (including a wheelchair)?  3  -  3  -TEZ  3  -CW    Standing up from a chair using your arms (e.g., wheelchair, bedside chair)?  3  -  3  -TEZ  3  -CW    Climbing 3-5 steps with a railing?  3  -  2  -TEZ  2  -CW    To walk in hospital room?  3  -  3  -TEZ  2  -CW    AM-PAC 6 Clicks Score  18  -  17  -  16  -CW       Functional Assessment    Outcome Measure Options  AM-PAC 6 Clicks Basic Mobility (PT)  -  AM-PAC 6 Clicks Basic Mobility (PT)  -  AM-PAC 6 Clicks Basic Mobility (PT)  -      User Key  (r) = Recorded By, (t) = Taken By, (c) = Cosigned By    Initials Name Provider Type    Elena Desir, PTA Physical Therapy Assistant    Pavel Antony, PTA Physical Therapy Assistant    Ivelisse Cary, PT Physical Therapist          PT Charges     Row Name 18 0834             Time Calculation    Start Time  834  -      Stop Time  912      Time  Calculation (min)  38 min  -JW      PT Received On  12/13/18  -JW         Time Calculation- PT    Total Timed Code Minutes- PT  38 minute(s)  -        User Key  (r) = Recorded By, (t) = Taken By, (c) = Cosigned By    Initials Name Provider Type    Elena Desir PTA Physical Therapy Assistant        Therapy Suggested Charges     Code   Minutes Charges    None             Rehab Goal Summary     Row Name 12/13/18 0834             Physical Therapy Goals    Bed Mobility Goal Selection (PT)  bed mobility, PT goal 1  -JW      Transfer Goal Selection (PT)  transfer, PT goal 1  -JW      Gait Training Goal Selection (PT)  gait training, PT goal 1  -JW         Bed Mobility Goal 1 (PT)    Activity/Assistive Device (Bed Mobility Goal 1, PT)  rolling to left;rolling to right;sidelying to sit/sit to sidelying;sit to supine/supine to sit  -JW      [place] Level/Cues Needed (Bed Mobility Goal 1, PT)  conditional independence  -JW      Time Frame (Bed Mobility Goal 1, PT)  3 days  -JW      Barriers (Bed Mobility Goal 1, PT)  spinal precautions, pain  -JW      Progress/Outcomes (Bed Mobility Goal 1, PT)  goal partially met  -         Transfer Goal 1 (PT)    Activity/Assistive Device (Transfer Goal 1, PT)  sit-to-stand/stand-to-sit;toilet;walker, rolling  -JW      [place] Level/Cues Needed (Transfer Goal 1, PT)  conditional independence  -JW      Time Frame (Transfer Goal 1, PT)  1 week  -JW      Barriers (Transfers Goal 1, PT)  spinal precautions, pain  -JW      Progress/Outcome (Transfer Goal 1, PT)  goal partially met  -         Gait Training Goal 1 (PT)    Activity/Assistive Device (Gait Training Goal 1, PT)  gait (walking locomotion);assistive device use;backward stepping;decrease asymmetrical patterns;decrease fall risk;diminish gait deviation;forward stepping;improve balance and speed;increase endurance/gait distance;increase energy conservation;maintain weight bearing status;sidestepping;turning,  left;turning, right;walker, rolling  -JW      [place] Level (Gait Training Goal 1, PT)  conditional independence  -JW      Distance (Gait Goal 1, PT)  1x250 ft or more  -JW      Time Frame (Gait Training Goal 1, PT)  1 week  -JW      Barriers (Gait Training Goal 1, PT)  spinal precautions, pain  -JW      Progress/Outcome (Gait Training Goal 1, PT)  goal partially met  -JW        User Key  (r) = Recorded By, (t) = Taken By, (c) = Cosigned By    Initials Name Provider Type Discipline    Elena Desir, PTA Physical Therapy Assistant PT              PT Discharge Summary  Anticipated Discharge Disposition (PT): home with 24/7 care, home with assist  Reason for Discharge: Discharge from facility, Per MD order  Outcomes Achieved: Unable to make functional progress toward goals at this time  Discharge Destination: Home      Savannah Johnson, PT   12/13/2018

## 2018-12-13 NOTE — PROGRESS NOTES
Pain controlled  Vitals:    12/13/18 0349   BP: 116/61   Pulse: 97   Resp: 18   Temp: 98.8 °F (37.1 °C)   SpO2: 95%     Hb 11.5 hct 31  Alert  Incision dry  No erythema  5/ 5 right and left hip flexor quadricep tibialis anterior extensor hallucis longus and gastrocnemius  Pulse present right and left legs  Calf soft  Brisk capillary refill  Foot warm and dry  Ambulate  Remove catheter.  Blood sugar improved  home

## 2018-12-13 NOTE — SIGNIFICANT NOTE
12/13/18 1250   Rehab Treatment   Discipline physical therapy assistant   Reason Treatment Not Performed patient/family declined treatment  (pt d/cing home, just finished getting dressed and wants to rest, defers PM tx)

## 2018-12-13 NOTE — PLAN OF CARE
Problem: Patient Care Overview  Goal: Plan of Care Review  Outcome: Ongoing (interventions implemented as appropriate)   12/13/18 0834   Coping/Psychosocial   Plan of Care Reviewed With patient   Plan of Care Review   Progress improving   OTHER   Outcome Summary pt Mod Ind w/ logrolling and SL to sit, sit/stands, ambualtes ~520' w/ RW w/ SBA/Mod Ind, up/down 5 steps w/ CGA x 1 for HHA as pt has no HR, issued HEP and home safety to pt and reviewed same, recommend HH PT for cont strengthening

## 2018-12-13 NOTE — PLAN OF CARE
Problem: Patient Care Overview  Goal: Plan of Care Review  Outcome: Ongoing (interventions implemented as appropriate)   12/13/18 0417   Coping/Psychosocial   Plan of Care Reviewed With patient   Plan of Care Review   Progress improving   OTHER   Outcome Summary VSS, pain controlled PO, ambulating to the bathroom, resting throughout the night, continue to monitor     Goal: Individualization and Mutuality  Outcome: Ongoing (interventions implemented as appropriate)      Problem: Fall Risk (Adult)  Goal: Absence of Fall  Outcome: Ongoing (interventions implemented as appropriate)      Problem: Laminectomy/Foraminotomy/Discectomy (Adult)  Goal: Signs and Symptoms of Listed Potential Problems Will be Absent, Minimized or Managed (Laminectomy/Foraminotomy/Discectomy)  Outcome: Ongoing (interventions implemented as appropriate)      Problem: Diabetes, Type 2 (Adult)  Goal: Signs and Symptoms of Listed Potential Problems Will be Absent, Minimized or Managed (Diabetes, Type 2)  Outcome: Ongoing (interventions implemented as appropriate)

## 2018-12-13 NOTE — DISCHARGE INSTRUCTIONS
Ambulate each day with walker  May shower  Dressing as needed  No bending or lifting.  No lifting over 5 pounds  Return for follow up orthopaedic surgery in 4 weeks.

## 2018-12-13 NOTE — THERAPY TREATMENT NOTE
Acute Care - Physical Therapy Treatment Note  [facility]     Patient Name: Lashell Zuniga  : 1965  MRN: [id number]  Today's Date: 2018  Onset of Illness/Injury or Date of Surgery: 18  Date of Referral to PT: 18  Referring Physician: Matt Salcedo MD    Admit Date: 2018    Visit Dx:    ICD-10-CM ICD-9-CM   1. Impaired functional mobility, balance, gait, and endurance Z74.09 V49.89   2. Back pain, unspecified back location, unspecified back pain laterality, unspecified chronicity M54.9 724.5   3. DDD (degenerative disc disease), lumbar M51.36 722.52     Patient Active Problem List   Diagnosis   • Back pain   • DDD (degenerative disc disease), lumbar       Therapy Treatment    Rehabilitation Treatment Summary     Row Name 18             Treatment Time/Intention    Discipline  physical therapy assistant  -JW      Document Type  therapy note (daily note)  -JW      Subjective Information  complains of;pain  -JW      Mode of Treatment  physical therapy;individual therapy  -JW      Therapy Frequency (PT Clinical Impression)  2 times/day  -JW      Patient Effort  good  -JW      Existing Precautions/Restrictions  fall;spinal  -JW      Recorded by [JW] Elena Saba, PTA 18 1003      Row Name 18             Vital Signs    Pre Systolic BP Rehab  131  -JW      Pre Treatment Diastolic BP  69  -JW      Pre Patient Position  Supine  -JW      Post Patient Position  Sitting  -JW      Recorded by [JW] Elena Saba, PTA 18 1003      Row Name 18             Cognitive Assessment/Intervention- PT/OT    Affect/Mental Status (Cognitive)  WNL  -JW      Orientation Status (Cognition)  oriented x 4  -JW      Recorded by [JW] Elena Saba, PTA 18 1003      Row Name 18             Bed Mobility Assessment/Treatment    Rolling Right [place] (Bed Mobility)  conditional independence  -JW      Sidelying-Sit  [place] (Bed Mobility)  conditional independence  -JW      Bed Mobility, Safety Issues  decreased use of legs for bridging/pushing;decreased use of arms for pushing/pulling;impaired trunk control for bed mobility  -JW      Assistive Device (Bed Mobility)  bed rails  -JW      Recorded by [JW] Elena Saba, PTA 12/13/18 1003      Row Name 12/13/18 0922             Sit-Stand Transfer    Sit-Stand [place] (Transfers)  conditional independence  -JW      Assistive Device (Sit-Stand Transfers)  walker, front-wheeled  -JW      Recorded by [JW] Elena Saba, PTA 12/13/18 1003      Row Name 12/13/18 0922             Stand-Sit Transfer    Stand-Sit [place] (Transfers)  conditional independence  -JW      Assistive Device (Stand-Sit Transfers)  walker, front-wheeled  -JW      Recorded by [JW] Elena Saba, PTA 12/13/18 1003      Row Name 12/13/18 0922             Gait/Stairs Assessment/Training    Gait/Stairs Assessment/Training  gait/ambulation independence;gait/ambulation assistive device;distance ambulated;gait pattern;gait deviations  -JW      [place] Level (Gait)  supervision;conditional independence  -JW      Assistive Device (Gait)  walker, front-wheeled  -JW      Distance in Feet (Gait)  520  -JW      Pattern (Gait)  step-to  -JW      Deviations/Abnormal Patterns (Gait)  stride length decreased;gait speed decreased pt appears steady.   -JW      [place] Level (Stairs)  contact guard  -JW      Handrail Location (Stairs)  none  -JW      Number of Steps (Stairs)  5  -JW      Ascending Technique (Stairs)  step-to-step  -JW      Descending Technique (Stairs)  step-to-step  -JW      Comment (Gait/Stairs)  performed step training x 2, used undersigned hand to act as HHA, pt states she will be able to do this at home w/ family  -JW      Recorded by [JW] Elena Saba, PTA 12/13/18 1003      Row Name 12/13/18 0922             Therapeutic Exercise    Therapeutic Exercise   seated, lower extremities  -JW      Recorded by [JW] Elena Saba, PTA 12/13/18 1003      Row Name 12/13/18 0922             Lower Extremity Seated Therapeutic Exercise    Performed, Seated Lower Extremity (Therapeutic Exercise)  ankle dorsiflexion/plantarflexion;LAQ (long arc quad), knee extension add pillow squranjeet hdez  -JW      Exercise Type, Seated Lower Extremity (Therapeutic Exercise)  AROM (active range of motion)  -JW      Sets/Reps Detail, Seated Lower Extremity (Therapeutic Exercise)  20  -JW      Comment, Seated Lower Extremity (Therapeutic Exercise)  pt issued HEP of above ther ex  -JW      Recorded by [JW] Elena Saba, PTA 12/13/18 1003      Row Name 12/13/18 0922             Positioning and Restraints    Pre-Treatment Position  in bed  -JW      Post Treatment Position  chair  -JW      In Chair  reclined;call light within reach;encouraged to call for assist  -JW      Recorded by [JW] Elena Saba, PTA 12/13/18 1003      Row Name 12/13/18 0922             Pain Scale: Numbers Pre/Post-Treatment    Pain Scale: Numbers, Pretreatment  3/10  -JW      Pain Scale: Numbers, Post-Treatment  3/10  -JW      Pain Location - Orientation  lower  -JW      Pain Location  back  -JW      Recorded by [JW] Elena Saba, PTA 12/13/18 1003      Row Name                Wound 12/11/18 1145 Left incision;surgical    Wound - Properties Group Date first assessed: 12/11/18 [KW] Time first assessed: 1145 [KW] Side: Left [KW] Type: incision;surgical [KW] Recorded by:  [KW] Cleo Dobbs RN 12/11/18 1204    Row Name 12/13/18 0922             Coping    Observed Emotional State  --  -JW      Recorded by [JW] Elena Saba, PTA 12/13/18 1003      Row Name 12/13/18 0922             Outcome Summary/Treatment Plan (PT)    Plan for Continued Treatment (PT)  cont  -JW      Anticipated Equipment Needs at Discharge (PT)  front wheeled walker;raised toilet seat  -JW      Anticipated Discharge  Disposition (PT)  home with 24/7 care;home with assist  -JW      Recorded by [JW] Elena Saba, PTA 12/13/18 1003        User Key  (r) = Recorded By, (t) = Taken By, (c) = Cosigned By    Initials Name Effective Dates Discipline    JW Elena Saba, PTA 08/11/15 -  PT    KW Cleo Dobbs RN 10/17/16 -  Nurse          Wound 12/11/18 1145 Left incision;surgical (Active)   Closure Adhesive closure strips;Open to air 12/13/2018  8:05 AM   Drainage Amount none 12/13/2018  8:05 AM       Rehab Goal Summary     Row Name 12/13/18 0834             Physical Therapy Goals    Bed Mobility Goal Selection (PT)  bed mobility, PT goal 1  -JW      Transfer Goal Selection (PT)  transfer, PT goal 1  -JW      Gait Training Goal Selection (PT)  gait training, PT goal 1  -JW         Bed Mobility Goal 1 (PT)    Activity/Assistive Device (Bed Mobility Goal 1, PT)  rolling to left;rolling to right;sidelying to sit/sit to sidelying;sit to supine/supine to sit  -JW      [place] Level/Cues Needed (Bed Mobility Goal 1, PT)  conditional independence  -JW      Time Frame (Bed Mobility Goal 1, PT)  3 days  -JW      Barriers (Bed Mobility Goal 1, PT)  spinal precautions, pain  -JW      Progress/Outcomes (Bed Mobility Goal 1, PT)  goal partially met  -JW         Transfer Goal 1 (PT)    Activity/Assistive Device (Transfer Goal 1, PT)  sit-to-stand/stand-to-sit;toilet;walker, rolling  -JW      [place] Level/Cues Needed (Transfer Goal 1, PT)  conditional independence  -JW      Time Frame (Transfer Goal 1, PT)  1 week  -JW      Barriers (Transfers Goal 1, PT)  spinal precautions, pain  -JW      Progress/Outcome (Transfer Goal 1, PT)  goal partially met  -JW         Gait Training Goal 1 (PT)    Activity/Assistive Device (Gait Training Goal 1, PT)  gait (walking locomotion);assistive device use;backward stepping;decrease asymmetrical patterns;decrease fall risk;diminish gait deviation;forward stepping;improve balance and  speed;increase endurance/gait distance;increase energy conservation;maintain weight bearing status;sidestepping;turning, left;turning, right;walker, rolling  -JW      [place] Level (Gait Training Goal 1, PT)  conditional independence  -JW      Distance (Gait Goal 1, PT)  1x250 ft or more  -JW      Time Frame (Gait Training Goal 1, PT)  1 week  -JW      Barriers (Gait Training Goal 1, PT)  spinal precautions, pain  -JW      Progress/Outcome (Gait Training Goal 1, PT)  goal partially met  -JW        User Key  (r) = Recorded By, (t) = Taken By, (c) = Cosigned By    Initials Name Provider Type Discipline    Elena Desir, PTA Physical Therapy Assistant PT          Physical Therapy Education     Title: PT OT SLP Therapies (In Progress)     Topic: Physical Therapy (In Progress)     Point: Mobility training (Done)     Learning Progress Summary           Patient Acceptance, E, VU,NR by CW at 12/11/2018  4:45 PM    Comment:  Patient education on role of PT in acute care, use of gait belt, spinal precautions, log rolling techniques, hip hinging for transfers, plan of care, and encouraged to call for assist to reduce risk of falls.                   Point: Body mechanics (Done)     Learning Progress Summary           Patient Acceptance, E, VU,NR by CW at 12/11/2018  4:45 PM    Comment:  Patient education on role of PT in acute care, use of gait belt, spinal precautions, log rolling techniques, hip hinging for transfers, plan of care, and encouraged to call for assist to reduce risk of falls.                   Point: Precautions (Done)     Learning Progress Summary           Patient Acceptance, E,TB,D, VU,DU by TEZ at 12/12/2018  3:40 PM    Comment:  pt able to recal 3/3 spine precautions. log rolling reinforced.    Acceptance, E, VU,DU by TEZ at 12/12/2018 12:11 PM    Comment:  pt edu on log rolling and spinal prec.    Acceptance, E, VU,NR by JOE at 12/11/2018  4:45 PM    Comment:  Patient education on role of PT  in acute care, use of gait belt, spinal precautions, log rolling techniques, hip hinging for transfers, plan of care, and encouraged to call for assist to reduce risk of falls.                               User Key     Initials Effective Dates Name Provider Type Discipline     03/07/18 -  Pavel Luna, PTA Physical Therapy Assistant PT     10/04/18 -  Ivelisse Dye PT Physical Therapist PT                PT Recommendation and Plan  Anticipated Discharge Disposition (PT): home with 24/7 care, home with assist  Therapy Frequency (PT Clinical Impression): 2 times/day  Outcome Summary/Treatment Plan (PT)  Plan for Continued Treatment (PT): cont  Anticipated Equipment Needs at Discharge (PT): front wheeled walker, raised toilet seat  Anticipated Discharge Disposition (PT): home with 24/7 care, home with assist  Plan of Care Reviewed With: patient  Progress: improving  Outcome Summary: pt Mod Ind w/ logrolling and SL to sit, sit/stands, ambualtes ~520' w/ RW w/ SBA/Mod Ind, up/down 5 steps w/ CGA x 1 for HHA as pt has no HR, issued HEP and home safety to pt and reviewed same, recommend  PT for cont strengthening  Outcome Measures     Row Name 12/13/18 0834 12/12/18 0832 12/11/18 1549       How much help from another person do you currently need...    Turning from your back to your side while in flat bed without using bedrails?  3  -  3  -TEZ  3  -CW    Moving from lying on back to sitting on the side of a flat bed without bedrails?  3  -  3  -TEZ  3  -CW    Moving to and from a bed to a chair (including a wheelchair)?  3  -  3  -TEZ  3  -CW    Standing up from a chair using your arms (e.g., wheelchair, bedside chair)?  3  -JW  3  -TEZ  3  -CW    Climbing 3-5 steps with a railing?  3  -JW  2  -TEZ  2  -CW    To walk in hospital room?  3  -JW  3  -TEZ  2  -CW    AM-PAC 6 Clicks Score  18  -JW  17  -TEZ  16  -CW       Functional Assessment    Outcome Measure Options  AM-PAC 6 Clicks Basic Mobility (PT)  -   AM-PAC 6 Clicks Basic Mobility (PT)  -TEZ  AM-PAC 6 Clicks Basic Mobility (PT)  -CW      User Key  (r) = Recorded By, (t) = Taken By, (c) = Cosigned By    Initials Name Provider Type    Elena Desir, PTA Physical Therapy Assistant    Pavel Antony, PTA Physical Therapy Assistant    CW Ivelisse Dey, PT Physical Therapist         Time Calculation:   PT Charges     Row Name 12/13/18 0834             Time Calculation    Start Time  0834  -      Stop Time  0912  -      Time Calculation (min)  38 min  -      PT Received On  12/13/18  -         Time Calculation- PT    Total Timed Code Minutes- PT  38 minute(s)  -        User Key  (r) = Recorded By, (t) = Taken By, (c) = Cosigned By    Initials Name Provider Type    Elena Desir PTA Physical Therapy Assistant        Therapy Suggested Charges     Code   Minutes Charges    None           Therapy Charges for Today     Code Description Service Date Service Provider Modifiers Qty    74587511765 HC GAIT TRAINING EA 15 MIN 12/13/2018 Elena Saba, PTA GP 1    99416503751 HC PT THER PROC EA 15 MIN 12/13/2018 Elena Saba, PTA GP 1    48876351922 HC PT SELF CARE/MGMT/TRAIN EA 15 MIN 12/13/2018 Elena Saba, PTA GP 1          PT G-Codes  PT Professional Judgement Used?: Yes  Outcome Measure Options: AM-PAC 6 Clicks Basic Mobility (PT)  AM-PAC 6 Clicks Score: 18  Functional Limitation: Mobility: Walking and moving around  Mobility: Walking and Moving Around Current Status (): At least 40 percent but less than 60 percent impaired, limited or restricted  Mobility: Walking and Moving Around Goal Status (): At least 1 percent but less than 20 percent impaired, limited or restricted    Elena Saba PTA  12/13/2018

## 2018-12-18 NOTE — DISCHARGE SUMMARY
[facility] Medicine Services  DISCHARGE SUMMARY       Date of Admission: 12/11/2018  Date of Discharge:  12/13    Presenting Problem/History of Present Illness:  Back pain, unspecified back location, unspecified back pain laterality, unspecified chronicity [M54.9]  DDD (degenerative disc disease), lumbar [M51.36]  Back pain, unspecified back location, unspecified back pain laterality, unspecified chronicity [M54.9]       Final Discharge Diagnoses:  Active Hospital Problems    Diagnosis   • Back pain   • DDD (degenerative disc disease), lumbar           Procedures Performed: Procedure(s):  LATERAL INTERBODY FUSION LUMBAR THREE-FOUR, LUMBAR FOUR-FIVE, WITH INSTRUMENTATION,INFUSE BONE GRAFT         neurotC-ARM#3)                    Hospital Course:  No complications.    She was excellent, great progress  Excellent condition for discharge      [place] [facility] [place] Medications:     Discharge Medications      New Medications      Instructions Start Date   docusate sodium 100 MG capsule  Commonly known as:  COLACE   100 mg, Oral, 2 Times Daily      methocarbamol 750 MG tablet  Commonly known as:  ROBAXIN   750 mg, Oral, 4 Times Daily PRN      oxyCODONE-acetaminophen 7.5-325 MG per tablet  Commonly known as:  PERCOCET   1 tablet, Oral, Every 6 Hours PRN         Continue These Medications      Instructions Start Date   albuterol sulfate  (90 Base) MCG/ACT inhaler  Commonly known as:  PROVENTIL HFA;VENTOLIN HFA;PROAIR HFA   2 puffs, Inhalation, Every 4 Hours PRN      raNITIdine 150 MG tablet  Commonly known as:  ZANTAC   300 mg, Oral, Daily      spironolactone 25 MG tablet  Commonly known as:  ALDACTONE   12.5 mg, Oral, Daily      tiZANidine 4 MG tablet  Commonly known as:  ZANAFLEX   4 mg, Oral, Every 8 Hours PRN      VIACTIV 500-500-40 MG-UNT-MCG chewable tablet  Generic drug:  Calcium-Vitamin D-Vitamin K   2 tablets, Oral, Daily          Stop These Medications    HYDROcodone-acetaminophen 7.5-325 MG per tablet  Commonly known as:  NORCO            Discharge Diet:  Regular    Activity at Discharge:     normal    Follow-up Appointments:   4 weeks orthopaedic surgery        Matt Salcedo MD  12/18/18  10:16 AM

## 2019-01-09 ENCOUNTER — OFFICE VISIT (OUTPATIENT)
Dept: ORTHOPEDIC SURGERY | Facility: CLINIC | Age: 54
End: 2019-01-09

## 2019-01-09 VITALS — WEIGHT: 213 LBS | BODY MASS INDEX: 35.49 KG/M2 | HEIGHT: 65 IN

## 2019-01-09 DIAGNOSIS — Z98.1 S/P LUMBAR FUSION: ICD-10-CM

## 2019-01-09 DIAGNOSIS — M51.36 DDD (DEGENERATIVE DISC DISEASE), LUMBAR: Primary | ICD-10-CM

## 2019-01-09 PROCEDURE — 99024 POSTOP FOLLOW-UP VISIT: CPT | Performed by: ORTHOPAEDIC SURGERY

## 2019-01-09 RX ORDER — OXYCODONE AND ACETAMINOPHEN 7.5; 325 MG/1; MG/1
1 TABLET ORAL EVERY 6 HOURS PRN
Qty: 80 TABLET | Refills: 0 | Status: SHIPPED | OUTPATIENT
Start: 2019-01-09

## 2019-01-09 RX ORDER — METHOCARBAMOL 750 MG/1
750 TABLET, FILM COATED ORAL 4 TIMES DAILY PRN
Qty: 80 TABLET | Refills: 1 | Status: SHIPPED | OUTPATIENT
Start: 2019-01-09

## 2019-01-09 NOTE — PROGRESS NOTES
"Postop Follow-up    Name:  Lashell Zuniga  Date:  2019  :  1965    Chief Complaint:    Chief Complaint   Patient presents with   • Lumbar Spine - Follow-up   • Wound Check   • Suture / Staple Removal     Date of surgery:         18 (29d) Matt Salcedo MD    LATERAL INTERBODY FUSION LUMBAR THREE-FOUR, LUMBAR FOUR-FIVE, WITH INSTRUMENTATION,INFUSE BONE GRAFT         neurotC-ARM#3)       Procedure:    History of Present Illness: follow up lumbar fusion. Pain scale today  6/10     53 y states pain is controlled, requesting refill of pain medicines,  Her post operative course has been uneventful, states she is doing well.        Current Outpatient Medications:   •  albuterol 108 (90 Base) MCG/ACT inhaler, Inhale 2 puffs Every 4 (Four) Hours As Needed for Wheezing., Disp: , Rfl:   •  Calcium-Vitamin D-Vitamin K (VIACTIV) 500-500-40 MG-UNT-MCG chewable tablet, Chew 2 tablets Daily., Disp: , Rfl:   •  docusate sodium (COLACE) 100 MG capsule, Take 1 capsule by mouth 2 (Two) Times a Day., Disp: 30 capsule, Rfl: 0  •  methocarbamol (ROBAXIN) 750 MG tablet, Take 1 tablet by mouth 4 (Four) Times a Day As Needed for Muscle Spasms., Disp: 80 tablet, Rfl: 1  •  oxyCODONE-acetaminophen (PERCOCET) 7.5-325 MG per tablet, Take 1 tablet by mouth Every 6 (Six) Hours As Needed for Moderate Pain ., Disp: 80 tablet, Rfl: 0  •  raNITIdine (ZANTAC) 150 MG tablet, Take 300 mg by mouth Daily., Disp: , Rfl:   •  spironolactone (ALDACTONE) 25 MG tablet, Take 12.5 mg by mouth Daily., Disp: , Rfl:   •  tiZANidine (ZANAFLEX) 4 MG tablet, Take 4 mg by mouth Every 8 (Eight) Hours As Needed for Muscle Spasms., Disp: , Rfl:     Allergies   Allergen Reactions   • Influenza Vaccines Nausea Only and Swelling   • Pneumococcal Vaccines Nausea Only and Swelling   • Corticosteroids Nausea Only and Rash         Exam:  Vitals:    19 1105   Weight: 96.6 kg (213 lb)   Height: 165.1 cm (65\")       The patient is awake, alert, " and oriented and in no apparent distress.    Right upper extremity:    Left upper extremity:    Right lower extremity:    Left lower extremity:    Incision healing well, no erythema, no drainage  5/5 right and left hip flexor quadricep tibialis anterior extensor hallucis longus and gastrocnemius.  Sensation intact to touch.    Xr Spine Lumbar 2 Or 3 View    Result Date: 1/9/2019  Narrative: Ordering Provider:  Matt Salcedo MD Ordering Diagnosis/Indication:  DDD (degenerative disc disease), lumbar Procedure:  XR SPINE LUMBAR 2 OR 3 VW Exam Date:  1/9/19 RELEVANT PRIOR IMAGES:  XR Spine Lumbar 2 or 3 View 12/05/2018 [id number] Final COMPARISON:  Todays X-rays were compared to previous images dated 12/5/2018.     Impression:  lumbar fusion L3-4 and L4-5, interbody cage instrumentation in excellent position, lumbar lordosis is good, interbody fixation in excellent position. Bone quality: good Alignment:  Lumbar lordosis is good Fracture: none Soft tissue: normal soft tissues paravertebral         Assessment:  Diagnoses and all orders for this visit:    DDD (degenerative disc disease), lumbar  -     XR Spine Lumbar 2 or 3 View    Other orders  -     oxyCODONE-acetaminophen (PERCOCET) 7.5-325 MG per tablet; Take 1 tablet by mouth Every 6 (Six) Hours As Needed for Moderate Pain .  -     methocarbamol (ROBAXIN) 750 MG tablet; Take 1 tablet by mouth 4 (Four) Times a Day As Needed for Muscle Spasms.          Plan:    Percocet is refilled, I cautioned her that narcotic medicines can lead to addiction, abuse can cause death or coma.  I reviewed post operative activity restrictions, precautions, no bending and lifting.      No Follow-up on file.      01/09/19 at 11:06 AM by Matt Salcedo MD

## 2019-02-20 ENCOUNTER — OFFICE VISIT (OUTPATIENT)
Dept: ORTHOPEDIC SURGERY | Facility: CLINIC | Age: 54
End: 2019-02-20

## 2019-02-20 VITALS — BODY MASS INDEX: 35.49 KG/M2 | WEIGHT: 213 LBS | HEIGHT: 65 IN

## 2019-02-20 DIAGNOSIS — M54.9 BACK PAIN, UNSPECIFIED BACK LOCATION, UNSPECIFIED BACK PAIN LATERALITY, UNSPECIFIED CHRONICITY: ICD-10-CM

## 2019-02-20 DIAGNOSIS — Z98.1 S/P LUMBAR FUSION: ICD-10-CM

## 2019-02-20 DIAGNOSIS — M51.36 DDD (DEGENERATIVE DISC DISEASE), LUMBAR: Primary | ICD-10-CM

## 2019-02-20 PROCEDURE — 99024 POSTOP FOLLOW-UP VISIT: CPT | Performed by: ORTHOPAEDIC SURGERY

## 2019-02-20 NOTE — PROGRESS NOTES
Lashell Zuniga is a 53 y.o. female is s/p       Chief Complaint   Patient presents with   • Lumbar Spine - Post-op       HISTORY OF PRESENT ILLNESS: post op lumbar fusion,  Patient states that she has a little pain,     States she rolled off bed increasing back pain.  Overall she is doing well.  No fevers, no chills  No nighttime awakening pain.        Allergies   Allergen Reactions   • Influenza Vaccines Nausea Only and Swelling   • Pneumococcal Vaccines Nausea Only and Swelling   • Corticosteroids Nausea Only and Rash         Current Outpatient Medications:   •  albuterol 108 (90 Base) MCG/ACT inhaler, Inhale 2 puffs Every 4 (Four) Hours As Needed for Wheezing., Disp: , Rfl:   •  Calcium-Vitamin D-Vitamin K (VIACTIV) 500-500-40 MG-UNT-MCG chewable tablet, Chew 2 tablets Daily., Disp: , Rfl:   •  docusate sodium (COLACE) 100 MG capsule, Take 1 capsule by mouth 2 (Two) Times a Day., Disp: 30 capsule, Rfl: 0  •  methocarbamol (ROBAXIN) 750 MG tablet, Take 1 tablet by mouth 4 (Four) Times a Day As Needed for Muscle Spasms., Disp: 80 tablet, Rfl: 1  •  oxyCODONE-acetaminophen (PERCOCET) 7.5-325 MG per tablet, Take 1 tablet by mouth Every 6 (Six) Hours As Needed for Moderate Pain ., Disp: 80 tablet, Rfl: 0  •  raNITIdine (ZANTAC) 150 MG tablet, Take 300 mg by mouth Daily., Disp: , Rfl:   •  spironolactone (ALDACTONE) 25 MG tablet, Take 12.5 mg by mouth Daily., Disp: , Rfl:   •  tiZANidine (ZANAFLEX) 4 MG tablet, Take 4 mg by mouth Every 8 (Eight) Hours As Needed for Muscle Spasms., Disp: , Rfl:     No fevers or chills.  No nausea or vomiting.      PHYSICAL EXAMINATION:       Lashell Zuniga is a 53 y.o. female    Patient is awake and alert, answers questions appropriately and is in no apparent distress.    GAIT:     []  Normal  []  Antalgic    Assistive device: []  None  []  Walker     []  Crutches  []  Cane     []  Wheelchair  []  Stretcher    Ortho Exam      Incision healing well  Minimal local erythema  4-5/5  right and left hip flexor quadricep tibialis anterior and extensor hallucis longus  Sensation intact to touch  Ambulating with a cane.           ASSESSMENT:    Diagnoses and all orders for this visit:    DDD (degenerative disc disease), lumbar  -     Ambulatory Referral to Physical Therapy Evaluate and treat, Ortho; Stretching, ROM, Strengthening; 3; 3; 3    S/P lumbar fusion  -     Ambulatory Referral to Physical Therapy Evaluate and treat, Ortho; Stretching, ROM, Strengthening; 3; 3; 3    Back pain, unspecified back location, unspecified back pain laterality, unspecified chronicity          PLAN    I offered physical therapy for increasing activity.  Prescription given physical therapy.    Xray lumbar spine with L5-S1 spot view          Matt Salcedo MD

## 2019-02-28 ENCOUNTER — HOSPITAL ENCOUNTER (OUTPATIENT)
Dept: PHYSICAL THERAPY | Facility: HOSPITAL | Age: 54
Setting detail: THERAPIES SERIES
Discharge: HOME | End: 2019-02-28

## 2019-02-28 DIAGNOSIS — M51.36 DDD (DEGENERATIVE DISC DISEASE), LUMBAR: ICD-10-CM

## 2019-02-28 DIAGNOSIS — Z98.1 S/P LUMBAR FUSION: Primary | ICD-10-CM

## 2019-02-28 PROCEDURE — 97162 PT EVAL MOD COMPLEX 30 MIN: CPT | Performed by: PHYSICAL THERAPIST

## 2019-03-04 ENCOUNTER — HOSPITAL ENCOUNTER (OUTPATIENT)
Dept: PHYSICAL THERAPY | Facility: HOSPITAL | Age: 54
Setting detail: THERAPIES SERIES
Discharge: HOME | End: 2019-03-04

## 2019-03-04 DIAGNOSIS — M51.36 DDD (DEGENERATIVE DISC DISEASE), LUMBAR: ICD-10-CM

## 2019-03-04 DIAGNOSIS — Z98.1 S/P LUMBAR FUSION: Primary | ICD-10-CM

## 2019-03-04 PROCEDURE — 97110 THERAPEUTIC EXERCISES: CPT | Performed by: PHYSICAL THERAPIST

## 2019-03-07 ENCOUNTER — HOSPITAL ENCOUNTER (OUTPATIENT)
Dept: PHYSICAL THERAPY | Facility: HOSPITAL | Age: 54
Setting detail: THERAPIES SERIES
Discharge: HOME | End: 2019-03-07

## 2019-03-07 DIAGNOSIS — Z98.1 S/P LUMBAR FUSION: Primary | ICD-10-CM

## 2019-03-07 DIAGNOSIS — M51.36 DDD (DEGENERATIVE DISC DISEASE), LUMBAR: ICD-10-CM

## 2019-03-07 PROCEDURE — 97110 THERAPEUTIC EXERCISES: CPT | Performed by: PHYSICAL THERAPIST

## 2019-03-07 NOTE — THERAPY TREATMENT NOTE
"    Outpatient Physical Therapy Ortho Treatment Note  [facility]     Patient Name: Lashell Zuniga  : 1965  MRN: [id number]  Today's Date: 3/7/2019      Visit Date: 2019  Attendance: 3/3  Subjective % Improvement: \"some\"  Recert Date: 3/20/19  MD appointment: 19    Therapy Diagnosis: L3-4, 4-5 fusion    Visit Dx:    ICD-10-CM ICD-9-CM   1. S/P lumbar fusion Z98.1 V45.4   2. DDD (degenerative disc disease), lumbar M51.36 722.52       Patient Active Problem List   Diagnosis   • Back pain   • DDD (degenerative disc disease), lumbar   • S/P lumbar fusion        Past Medical History:   Diagnosis Date   • Arthritis    • Asthma    • Depression with anxiety    • Diabetes (CMS/HCC)     no longer using medication   • Hypertension    • Kidney disease    • Kidney stone    • Lupus    • Sleep apnea     using c-pap occasionally   • Stomach ulcer    • Thyroid disease         Past Surgical History:   Procedure Laterality Date   • CHOLECYSTECTOMY     • CYSTOSCOPY INSERTION / REMOVAL STENT / STONE     • FOOT SURGERY Right    • GASTRIC BYPASS     • HYSTERECTOMY     • LUMBAR DIRECT LATERAL INTERBODY FUSION N/A 2018    Procedure: LATERAL INTERBODY FUSION LUMBAR THREE-FOUR, LUMBAR FOUR-FIVE, WITH INSTRUMENTATION,INFUSE BONE GRAFT         neurotC-ARM#3);  Surgeon: Matt Salcedo MD;  Location: [facility];  Service: Orthopedic Spine   • TUBAL ABDOMINAL LIGATION         PT Ortho     Row Name 19 0910       Subjective Comments    Subjective Comments  Reports feeling good after less therapy last time but noted feeling sore later for a few hours \"i felt better longer than i hurt\"  -BB       Precautions and Contraindications    Precautions/Limitations  spinal precautions  -BB    Precautions  \"No more than milk jug\"  -BB    Contraindications  L3-4, 4-5 fusion  -BB       Subjective Pain    Able to rate subjective pain?  yes  -BB    Pre-Treatment Pain Level  4  -BB    " "Post-Treatment Pain Level  -- \"sore\"  -BB       Posture/Observations    Posture/Observations Comments  cues needed to improve sitting position. Able to assist RLE on left but unable to lift left-still noted at shin   -BB      User Key  (r) = Recorded By, (t) = Taken By, (c) = Cosigned By    Initials Name Provider Type    Pavithra Awad, PT Physical Therapist                      PT Assessment/Plan     Row Name 03/07/19 0910          PT Assessment    Assessment Comments  Tolerated treatment well. Possible neural tension on left. Soft endfeel with manual stretching of bilateral hips with back kept in neutral position in hooklying.   -BB     Patient would benefit from skilled therapy intervention  Yes  -BB        PT Plan    PT Frequency  2x/week  -BB     Predicted Duration of Therapy Intervention (Therapy Eval)  4 weeks  -BB     PT Plan Comments  Possible multifidus strengthening next-antitrunk rotation  -BB       User Key  (r) = Recorded By, (t) = Taken By, (c) = Cosigned By    Initials Name Provider Type    Pavithra Awad, PT Physical Therapist          Modalities     Row Name 03/07/19 0910             Moist Heat    MH Applied  Yes  -BB      Location  low back in sitting   -BB      Rx Minutes  12 mins  -BB      MH Prior to Rx  Yes  -BB        User Key  (r) = Recorded By, (t) = Taken By, (c) = Cosigned By    Initials Name Provider Type    Pavithra Awad, PT Physical Therapist          Exercises     Row Name 03/07/19 0910             Subjective Comments    Subjective Comments  Reports feeling good after less therapy last time but noted feeling sore later for a few hours \"i felt better longer than i hurt\"  -BB         Subjective Pain    Able to rate subjective pain?  yes  -BB      Pre-Treatment Pain Level  4  -BB      Post-Treatment Pain Level  -- \"sore\"  -BB         Exercise 1    Exercise Name 1  Gentle hip distraction   -BB      Sets 1  1  -BB      Reps 1  10  -BB      Time 1  5\" distractions   -BB         " "Exercise 2    Exercise Name 2  manual hip ER   -BB      Sets 2  3  -BB      Time 2  30\"  -BB      Additional Comments  each   -BB         Exercise 3    Exercise Name 3  manual HS stretch with DF   -BB      Sets 3  1  -BB      Reps 3  15  -BB      Additional Comments  each   -BB         Exercise 4    Exercise Name 4  manual hip IR   -BB      Sets 4  3  -BB      Time 4  30\"   -BB      Additional Comments  each   -BB         Exercise 5    Exercise Name 5  Left sciatic nerve glide   -BB      Sets 5  1  -BB      Reps 5  15 APs  -BB         Exercise 6    Exercise Name 6  MHP to low back in sitting   -BB      Time 6  12  -BB        User Key  (r) = Recorded By, (t) = Taken By, (c) = Cosigned By    Initials Name Provider Type    Pavithra Awad PT Physical Therapist                         PT OP Goals     Row Name 03/07/19 0910          PT Short Term Goals    STG Date to Achieve  03/21/19  -BB     STG 1  Independent in HEP   -BB     STG 2  Hip flexion/abduction MMT sitting 5/5   -BB     STG 3  Ambulate safely with no AD and complete 6' walk test   -BB     STG 4  Be able to don/doff shoes independently   -BB        Time Calculation    PT Goal Re-Cert Due Date  03/21/19  -BB       User Key  (r) = Recorded By, (t) = Taken By, (c) = Cosigned By    Initials Name Provider Type    Pavithra Awad PT Physical Therapist                         Time Calculation:   Start Time: 0910  Stop Time: 0957  Time Calculation (min): 47 min    Therapy Charges for Today     Code Description Service Date Service Provider Modifiers Qty    88198257170 HC PT THER PROC EA 15 MIN 3/7/2019 Pavithra Worthy, PT GP 2    78643879127 HC PT THER SUPP EA 15 MIN 3/7/2019 Pavithra Worthy, PT GP 1                    Pavithra Worthy PT  3/7/2019     "

## 2019-03-11 ENCOUNTER — HOSPITAL ENCOUNTER (OUTPATIENT)
Dept: PHYSICAL THERAPY | Facility: HOSPITAL | Age: 54
Setting detail: THERAPIES SERIES
Discharge: HOME | End: 2019-03-11

## 2019-03-11 DIAGNOSIS — Z98.1 S/P LUMBAR FUSION: Primary | ICD-10-CM

## 2019-03-11 PROCEDURE — 97110 THERAPEUTIC EXERCISES: CPT

## 2019-03-11 NOTE — THERAPY TREATMENT NOTE
Outpatient Physical Therapy Ortho Treatment Note  [facility]     Patient Name: Lashell Zuniga  : 1965  MRN: [id number]  Today's Date: 3/11/2019      Visit Date: 2019  Pt has attended; 4/4 visits  Subjectiveimprovement some  Recert 19  MD 19  Visit Dx:    ICD-10-CM ICD-9-CM   1. S/P lumbar fusion Z98.1 V45.4       Patient Active Problem List   Diagnosis   • Back pain   • DDD (degenerative disc disease), lumbar   • S/P lumbar fusion        Past Medical History:   Diagnosis Date   • Arthritis    • Asthma    • Depression with anxiety    • Diabetes (CMS/HCC)     no longer using medication   • Hypertension    • Kidney disease    • Kidney stone    • Lupus    • Sleep apnea     using c-pap occasionally   • Stomach ulcer    • Thyroid disease         Past Surgical History:   Procedure Laterality Date   • CHOLECYSTECTOMY     • CYSTOSCOPY INSERTION / REMOVAL STENT / STONE     • FOOT SURGERY Right    • GASTRIC BYPASS     • HYSTERECTOMY     • LUMBAR DIRECT LATERAL INTERBODY FUSION N/A 2018    Procedure: LATERAL INTERBODY FUSION LUMBAR THREE-FOUR, LUMBAR FOUR-FIVE, WITH INSTRUMENTATION,INFUSE BONE GRAFT         neurotC-ARM#3);  Surgeon: Matt Salcedo MD;  Location: [facility];  Service: Orthopedic Spine   • TUBAL ABDOMINAL LIGATION                         PT Assessment/Plan     Row Name 19 0812          PT Assessment    Assessment Comments  Tolerates supine with ball S activities well--also no distress with bike will monitor this as time and add time as tolerated. Pt was able to place B foot close to knee with piriformis S in supine  -SP        PT Plan    PT Frequency  2x/week  -SP     Predicted Duration of Therapy Intervention (Therapy Eval)  4 weeks  -SP     PT Plan Comments  Monitor use of bike and continue with S program and core strength as tolerated  -SP       User Key  (r) = Recorded By, (t) = Taken By, (c) = Cosigned By    Initials Name  Provider Type    Tracee Campos PTA Physical Therapy Assistant          Modalities     Row Name 03/11/19 0700             Subjective Pain    Post-Treatment Pain Level  4  -SP         Moist Heat    MH Applied  Yes  -SP      Location  low back seated  -SP      Rx Minutes  12 mins  -SP      MH Prior to Rx  Yes  -SP        User Key  (r) = Recorded By, (t) = Taken By, (c) = Cosigned By    Initials Name Provider Type    Tracee Campos PTA Physical Therapy Assistant          Exercises     Row Name 03/11/19 0700             Subjective Comments    Subjective Comments  Notes she thinks she had a kidney stone did not go to MD and has not passed it that she knows of yet--alsonotes that she burned her abdomen using heating pad over the weekend--states she has blisters  -SP         Subjective Pain    Able to rate subjective pain?  yes  -SP      Pre-Treatment Pain Level  6  -SP      Post-Treatment Pain Level  4  -SP         Exercise 1    Exercise Name 1  PRo 2 seat 8 Level 1  -SP      Time 1  6 min  -SP         Exercise 2    Exercise Name 2  Incline S HS S  -SP      Reps 2  3  -SP      Time 2  30 sec  -SP         Exercise 3    Exercise Name 3  modified midback S on tball  -SP      Reps 3  3  -SP      Time 3  30 sec  -SP         Exercise 4    Exercise Name 4  seated Le nerve glide  -SP      Reps 4  10  -SP      Additional Comments  L worse than R   -SP         Exercise 5    Exercise Name 5  tball B HS S   -SP      Reps 5  3  -SP      Time 5  30  sec  -SP         Exercise 6    Exercise Name 6  tball B piriformis S adding AROM x 10 after S  -SP      Reps 6  3  -SP      Time 6  30 sec  -SP         Exercise 7    Exercise Name 7  assist with IR ER with S B  -SP      Reps 7  3  -SP      Time 7  30 sec  -SP         Exercise 8    Exercise Name 8  see modalities  -SP        User Key  (r) = Recorded By, (t) = Taken By, (c) = Cosigned By    Initials Name Provider Type    Tracee Campos PTA Physical Therapy Assistant                          PT OP Goals     Row Name 03/11/19 0816 03/11/19 0700       PT Short Term Goals    STG Date to Achieve  --  03/21/19  -SP    STG 1  --  Independent in HEP   -SP    STG 1 Progress  --  Ongoing  -SP    STG 2  --  Hip flexion/abduction MMT sitting 5/5   -SP    STG 2 Progress  --  Ongoing  -SP    STG 3  --  Ambulate safely with no AD and complete 6' walk test   -SP    STG 3 Progress  --  Ongoing  -SP    STG 4  --  Be able to don/doff shoes independently   -SP    STG 4 Progress  --  Ongoing  -SP       Time Calculation    PT Goal Re-Cert Due Date  03/20/19  -SP  03/21/19  -SP      User Key  (r) = Recorded By, (t) = Taken By, (c) = Cosigned By    Initials Name Provider Type    Tracee Campos PTA Physical Therapy Assistant                         Time Calculation:   Start Time: 0720  Stop Time: 0816  Time Calculation (min): 56 min  PT Non-Billable Time (min): 15 min  Total Timed Code Minutes- PT: 45 minute(s)  Therapy Suggested Charges     Code   Minutes Charges    None           Therapy Charges for Today     Code Description Service Date Service Provider Modifiers Qty    04047970122 HC PT THER SUPP EA 15 MIN 3/11/2019 Tracee Banks PTA GP 1    29687066424 HC PT THER PROC EA 15 MIN 3/11/2019 Tracee Banks PTA GP 3                    Tracee Banks PTA  3/11/2019

## 2019-03-13 ENCOUNTER — HOSPITAL ENCOUNTER (OUTPATIENT)
Dept: PHYSICAL THERAPY | Facility: HOSPITAL | Age: 54
Setting detail: THERAPIES SERIES
Discharge: HOME | End: 2019-03-13

## 2019-03-13 DIAGNOSIS — Z98.1 S/P LUMBAR FUSION: Primary | ICD-10-CM

## 2019-03-13 PROCEDURE — 97110 THERAPEUTIC EXERCISES: CPT

## 2019-03-13 NOTE — THERAPY TREATMENT NOTE
Outpatient Physical Therapy Ortho Treatment Note  [facility]     Patient Name: Lashell Zuniga  : 1965  MRN: [id number]  Today's Date: 3/13/2019      Visit Date: 2019  Pt has attended 5/5 visits  Subjective improvement some  Recert 19  MD 19  Visit Dx:    ICD-10-CM ICD-9-CM   1. S/P lumbar fusion Z98.1 V45.4       Patient Active Problem List   Diagnosis   • Back pain   • DDD (degenerative disc disease), lumbar   • S/P lumbar fusion        Past Medical History:   Diagnosis Date   • Arthritis    • Asthma    • Depression with anxiety    • Diabetes (CMS/HCC)     no longer using medication   • Hypertension    • Kidney disease    • Kidney stone    • Lupus    • Sleep apnea     using c-pap occasionally   • Stomach ulcer    • Thyroid disease         Past Surgical History:   Procedure Laterality Date   • CHOLECYSTECTOMY     • CYSTOSCOPY INSERTION / REMOVAL STENT / STONE     • FOOT SURGERY Right    • GASTRIC BYPASS     • HYSTERECTOMY     • LUMBAR DIRECT LATERAL INTERBODY FUSION N/A 2018    Procedure: LATERAL INTERBODY FUSION LUMBAR THREE-FOUR, LUMBAR FOUR-FIVE, WITH INSTRUMENTATION,INFUSE BONE GRAFT         neurotC-ARM#3);  Surgeon: Matt Salcedo MD;  Location: [facility];  Service: Orthopedic Spine   • TUBAL ABDOMINAL LIGATION                         PT Assessment/Plan     Row Name 19 0811          PT Assessment    Assessment Comments  Moving little slow this am slightly sore after initiation of S but also feels weather issues are making her stiff all over.  No increase in pain after Rx  -SP        PT Plan    PT Frequency  2x/week  -SP     Predicted Duration of Therapy Intervention (Therapy Eval)  4 weeks  -SP     PT Plan Comments  Monitor S and slowly increase pro 2 time and core work  -SP       User Key  (r) = Recorded By, (t) = Taken By, (c) = Cosigned By    Initials Name Provider Type    Tracee Campos PTA Physical Therapy Assistant           Modalities     Row Name 03/13/19 0700             Moist Heat    MH Applied  Yes  -SP      Location  low back seated  -SP      Rx Minutes  12 mins  -SP      MH S/P Rx  Yes  -SP        User Key  (r) = Recorded By, (t) = Taken By, (c) = Cosigned By    Initials Name Provider Type    Tracee Campos PTA Physical Therapy Assistant          Exercises     Row Name 03/13/19 0700             Subjective Comments    Subjective Comments  Was a little sore  but some pain due to weather  -SP         Subjective Pain    Able to rate subjective pain?  yes  -SP      Pre-Treatment Pain Level  7  -SP      Post-Treatment Pain Level  7  -SP         Exercise 1    Exercise Name 1  Pro 2 seat 9 Level 1  -SP      Time 1  8 min  -SP         Exercise 2    Exercise Name 2  Incline S HS  -SP      Reps 2  3  -SP      Time 2  30 sec  -SP         Exercise 3    Exercise Name 3  modified midback S on tball  -SP      Reps 3  3  -SP      Time 3  30 sec  -SP         Exercise 4    Exercise Name 4  B LE HS S on tball  -SP      Time 4  2 min  -SP         Exercise 5    Exercise Name 5  tball ham curls  -SP      Reps 5  3  -SP      Time 5  30 sec  -SP         Exercise 6    Exercise Name 6  tball piriformis S  -SP      Reps 6  3  -SP      Time 6  30 sec  -SP         Exercise 7    Exercise Name 7  bent knee fall outs B  -SP      Reps 7  10  -SP         Exercise 8    Exercise Name 8  B hip S with overpressure with low back in neutral  -SP      Reps 8  10  -SP         Exercise 9    Exercise Name 9  supine ab crunch  -SP      Reps 9  10  -SP      Time 9  3sec  -SP         Exercise 10    Exercise Name 10  standing side S  -SP      Reps 10  3  -SP      Time 10  30 sec  -SP         Exercise 11    Exercise Name 11  see modalities  -SP        User Key  (r) = Recorded By, (t) = Taken By, (c) = Cosigned By    Initials Name Provider Type    Tracee Campos PTA Physical Therapy Assistant                         PT OP Goals     Row Name 03/13/19 6772  03/13/19 0700       PT Short Term Goals    STG Date to Achieve  --  03/21/19  -SP    STG 1  --  Independent in HEP   -SP    STG 1 Progress  --  Ongoing  -SP    STG 2  --  Hip flexion/abduction MMT sitting 5/5   -SP    STG 2 Progress  --  Ongoing  -SP    STG 3  --  Ambulate safely with no AD and complete 6' walk test   -SP    STG 3 Progress  --  Ongoing  -SP    STG 4  --  Be able to don/doff shoes independently   -SP    STG 4 Progress  --  Ongoing  -SP       Time Calculation    PT Goal Re-Cert Due Date  04/01/19  -SP  03/20/19  -      User Key  (r) = Recorded By, (t) = Taken By, (c) = Cosigned By    Initials Name Provider Type    Tracee Campos PTA Physical Therapy Assistant                         Time Calculation:   Start Time: 0715  Stop Time: 0815  Time Calculation (min): 60 min  PT Non-Billable Time (min): 15 min  Total Timed Code Minutes- PT: 45 minute(s)  Therapy Suggested Charges     Code   Minutes Charges    None           Therapy Charges for Today     Code Description Service Date Service Provider Modifiers Qty    83915121549 HC PT THER SUPP EA 15 MIN 3/13/2019 Tracee Banks PTA GP 1    73879562956 HC PT THER PROC EA 15 MIN 3/13/2019 Tracee Banks PTA GP 3                    Tracee Banks PTA  3/13/2019

## 2019-03-18 ENCOUNTER — HOSPITAL ENCOUNTER (OUTPATIENT)
Dept: PHYSICAL THERAPY | Facility: HOSPITAL | Age: 54
Setting detail: THERAPIES SERIES
Discharge: HOME | End: 2019-03-18

## 2019-03-18 DIAGNOSIS — Z98.1 S/P LUMBAR FUSION: Primary | ICD-10-CM

## 2019-03-18 PROCEDURE — 97110 THERAPEUTIC EXERCISES: CPT

## 2019-03-18 NOTE — THERAPY TREATMENT NOTE
Outpatient Physical Therapy Ortho Treatment Note  [facility]     Patient Name: Lashell Zuniga  : 1965  MRN: [id number]  Today's Date: 3/18/2019      Visit Date: 2019  Pt has attended 6/6 visits  Subjective improvement 50 percent  Recert 19  MD 19  Visit Dx:    ICD-10-CM ICD-9-CM   1. S/P lumbar fusion Z98.1 V45.4       Patient Active Problem List   Diagnosis   • Back pain   • DDD (degenerative disc disease), lumbar   • S/P lumbar fusion        Past Medical History:   Diagnosis Date   • Arthritis    • Asthma    • Depression with anxiety    • Diabetes (CMS/HCC)     no longer using medication   • Hypertension    • Kidney disease    • Kidney stone    • Lupus    • Sleep apnea     using c-pap occasionally   • Stomach ulcer    • Thyroid disease         Past Surgical History:   Procedure Laterality Date   • CHOLECYSTECTOMY     • CYSTOSCOPY INSERTION / REMOVAL STENT / STONE     • FOOT SURGERY Right    • GASTRIC BYPASS     • HYSTERECTOMY     • LUMBAR DIRECT LATERAL INTERBODY FUSION N/A 2018    Procedure: LATERAL INTERBODY FUSION LUMBAR THREE-FOUR, LUMBAR FOUR-FIVE, WITH INSTRUMENTATION,INFUSE BONE GRAFT         neurotC-ARM#3);  Surgeon: Matt Salcedo MD;  Location: [facility];  Service: Orthopedic Spine   • TUBAL ABDOMINAL LIGATION                         PT Assessment/Plan     Row Name 19 0757          PT Assessment    Assessment Comments  Very stiff this date--no distress with ab work and tolerates pro 2 10 min  -SP        PT Plan    PT Frequency  2x/week  -SP     Predicted Duration of Therapy Intervention (Therapy Eval)  4 weeks  -SP     PT Plan Comments  6 min walk test--recert next week  -SP       User Key  (r) = Recorded By, (t) = Taken By, (c) = Cosigned By    Initials Name Provider Type    Tracee Campos PTA Physical Therapy Assistant          Modalities     Row Name 19 0700             Moist Heat    MH Applied  Yes  -SP       Location  low back seated  -SP      Rx Minutes  12 mins  -SP      MH S/P Rx  Yes  -SP        User Key  (r) = Recorded By, (t) = Taken By, (c) = Cosigned By    Initials Name Provider Type    Tracee Campos PTA Physical Therapy Assistant          Exercises     Row Name 03/18/19 0800 03/18/19 0700          Subjective Comments    Subjective Comments  --  stiff  this morning--cold again  -SP        Subjective Pain    Able to rate subjective pain?  --  yes  -SP     Pre-Treatment Pain Level  --  5  -SP     Post-Treatment Pain Level  3  -SP  --        Exercise 1    Exercise Name 1  --  Pro 2 seat 12 Level 1  -SP     Time 1  --  10 min  -SP        Exercise 2    Exercise Name 2  --  Incline S Hs S  -SP     Reps 2  --  3  -SP     Time 2  --  30 sec  -SP        Exercise 3    Exercise Name 3  --  tball midback S  -SP     Reps 3  --  3  -SP     Time 3  --  30 sec  -SP        Exercise 4    Exercise Name 4  --  tball piriformis S  -SP     Reps 4  --  3  -SP     Time 4  --  3- min  -SP        Exercise 5    Exercise Name 5  --  tball ham curls holding small weighted ball at 90 degrees  -SP     Reps 5  --  20  -SP        Exercise 6    Exercise Name 6  --  tball ab crunch feet on ball  -SP     Reps 6  --  20  -SP        Exercise 7    Exercise Name 7  --  bent knee fall outs B  -SP     Reps 7  --  10  -SP        Exercise 8    Exercise Name 8  --  seated overhead ball lifts small red ball  -SP     Reps 8  --  15  -SP        Exercise 9    Exercise Name 9  --  see modalities  -SP       User Key  (r) = Recorded By, (t) = Taken By, (c) = Cosigned By    Initials Name Provider Type    Tracee Campos PTA Physical Therapy Assistant                         PT OP Goals     Row Name 03/18/19 0758 03/18/19 0700       PT Short Term Goals    STG Date to Achieve  --  03/21/19  -SP    STG 1  --  Independent in HEP   -SP    STG 1 Progress  --  Ongoing  -SP    STG 2  --  Hip flexion/abduction MMT sitting 5/5   -SP    STG 2 Progress  --   Ongoing  -SP    STG 3  --  Ambulate safely with no AD and complete 6' walk test   -SP    STG 3 Progress  --  Ongoing  -SP    STG 4  --  Be able to don/doff shoes independently   -SP    STG 4 Progress  --  Ongoing  -SP       Time Calculation    PT Goal Re-Cert Due Date  03/20/19  -SP  04/01/19  -SP      User Key  (r) = Recorded By, (t) = Taken By, (c) = Cosigned By    Initials Name Provider Type    Tracee Campos PTA Physical Therapy Assistant                         Time Calculation:   Start Time: 0715  Stop Time: 0815  Time Calculation (min): 60 min  PT Non-Billable Time (min): 15 min  Total Timed Code Minutes- PT: 45 minute(s)  Therapy Suggested Charges     Code   Minutes Charges    None           Therapy Charges for Today     Code Description Service Date Service Provider Modifiers Qty    04251705577 HC PT THER PROC EA 15 MIN 3/18/2019 Tracee Banks PTA GP 3    67930547873 HC PT THER SUPP EA 15 MIN 3/18/2019 Tracee Banks PTA GP 1                    Tracee Banks PTA  3/18/2019

## 2019-03-20 ENCOUNTER — HOSPITAL ENCOUNTER (OUTPATIENT)
Dept: PHYSICAL THERAPY | Facility: HOSPITAL | Age: 54
Setting detail: THERAPIES SERIES
Discharge: HOME | End: 2019-03-20

## 2019-03-20 DIAGNOSIS — Z98.1 S/P LUMBAR FUSION: Primary | ICD-10-CM

## 2019-03-20 PROCEDURE — 97110 THERAPEUTIC EXERCISES: CPT

## 2019-03-20 PROCEDURE — 97116 GAIT TRAINING THERAPY: CPT

## 2019-03-20 NOTE — THERAPY TREATMENT NOTE
Outpatient Physical Therapy Hand Treatment Note   [facility]     Patient Name: Lashell Zuniga  : 1965  MRN: [id number]  Today's Date: 3/20/2019         Visit Date: 2019  Pt has attended 7/7 visits  Subjective improvement 50 percent  Amy 19  MD 19  Visit Dx:    ICD-10-CM ICD-9-CM   1. S/P lumbar fusion Z98.1 V45.4       Patient Active Problem List   Diagnosis   • Back pain   • DDD (degenerative disc disease), lumbar   • S/P lumbar fusion                      PT Assessment/Plan     Row Name 19 0815          PT Assessment    Assessment Comments  Less painful this morning and attempted 6 min walk test--was unable to make full 6 min lasted 2 min 30 sec and made 270 feet using st cane--likes ball work and slowly progressing core work  -SP        PT Plan    PT Frequency  2x/week  -SP     Predicted Duration of Therapy Intervention (Therapy Eval)  4 weeks  -SP     PT Plan Comments  Monitor response with walk test and continue with core work and B hip S  -SP       User Key  (r) = Recorded By, (t) = Taken By, (c) = Cosigned By    Initials Name Provider Type    Tracee Campos PTA Physical Therapy Assistant          Modalities     Row Name 19 0700             Moist Heat    MH Applied  Yes  -SP      Location  low back seated  -SP      Rx Minutes  12 mins  -SP      MH S/P Rx  Yes  -SP        User Key  (r) = Recorded By, (t) = Taken By, (c) = Cosigned By    Initials Name Provider Type    Tracee Campos PTA Physical Therapy Assistant        Exercises     Row Name 19 0800 19 0700          Subjective Comments    Subjective Comments  --  Used heating pad before Rx this morning  -SP        Subjective Pain    Able to rate subjective pain?  --  yes  -SP     Pre-Treatment Pain Level  --  3  -SP     Post-Treatment Pain Level  4  -SP  --     Subjective Pain Comment  --  uses standard cane for ambulation  -SP        Exercise 1    Exercise Name 1  --  Pro 2 seat 11  Level 1.5  -SP     Time 1  --  10 min  -SP        Exercise 2    Exercise Name 2  --  incline S  -SP     Reps 2  --  3  -SP     Time 2  --  30 sec  -SP        Exercise 3    Exercise Name 3  --  tball midback S  -SP     Reps 3  --  3  -SP     Time 3  --  30 sec  -SP        Exercise 4    Exercise Name 4  --  tball piriformis S  -SP     Reps 4  --  3  -SP     Time 4  --  30 sec  -SP        Exercise 5    Exercise Name 5  --  6 min walk test  -SP     Time 5  --  2 min 30 sec  -SP     Additional Comments  --  270 feet  -SP        Exercise 6    Exercise Name 6  --  BK fall outs  -SP     Reps 6  --  20  -SP        Exercise 7    Exercise Name 7  --  cecil disc seated overhead ball lifts  -SP     Reps 7  --  20  -SP        Exercise 8    Exercise Name 8  --  cecil disc seated LAQ with add  -SP     Reps 8  --  20  -SP        Exercise 9    Exercise Name 9  --  see modalities  -SP       User Key  (r) = Recorded By, (t) = Taken By, (c) = Cosigned By    Initials Name Provider Type    Tracee Campos, TAVARES Physical Therapy Assistant                       PT OP Goals     Row Name 03/20/19 0816 03/20/19 0700       PT Short Term Goals    STG Date to Achieve  --  03/21/19  -SP    STG 1  --  Independent in HEP   -SP    STG 1 Progress  --  Ongoing  -SP    STG 2  --  Hip flexion/abduction MMT sitting 5/5   -SP    STG 2 Progress  --  Ongoing  -SP    STG 3  --  Ambulate safely with no AD and complete 6' walk test   -SP    STG 3 Progress  --  Ongoing  -SP    STG 3 Progress Comments  --  270 feet 2 min 30 sec  -SP    STG 4  --  Be able to don/doff shoes independently   -SP    STG 4 Progress  --  Ongoing  -SP    STG 4 Progress Comments  --  wears slipon shoes  -SP       Time Calculation    PT Goal Re-Cert Due Date  03/20/19  -SP  04/01/19  -SP      User Key  (r) = Recorded By, (t) = Taken By, (c) = Cosigned By    Initials Name Provider Type    Tracee Campos, TAVARES Physical Therapy Assistant                         Time Calculation:   Start  Time: 0715  Stop Time: 0816  Time Calculation (min): 61 min  PT Non-Billable Time (min): 15 min  Total Timed Code Minutes- PT: 45 minute(s)   Therapy Suggested Charges     Code   Minutes Charges    None           Therapy Charges for Today     Code Description Service Date Service Provider Modifiers Qty    29771540076 HC PT THER PROC EA 15 MIN 3/20/2019 Tracee Banks PTA GP 2    13080274177 HC GAIT TRAINING EA 15 MIN 3/20/2019 Tracee Banks PTA GP 1    32007295234 HC PT THER SUPP EA 15 MIN 3/20/2019 Tracee Banks PTA GP 1                   Tracee Banks PTA  3/20/2019

## 2019-03-25 ENCOUNTER — APPOINTMENT (OUTPATIENT)
Dept: PHYSICAL THERAPY | Facility: HOSPITAL | Age: 54
End: 2019-03-25

## 2019-03-27 ENCOUNTER — APPOINTMENT (OUTPATIENT)
Dept: PHYSICAL THERAPY | Facility: HOSPITAL | Age: 54
End: 2019-03-27

## 2019-03-28 ENCOUNTER — APPOINTMENT (OUTPATIENT)
Dept: PHYSICAL THERAPY | Facility: HOSPITAL | Age: 54
End: 2019-03-28

## 2019-04-01 ENCOUNTER — HOSPITAL ENCOUNTER (OUTPATIENT)
Dept: PHYSICAL THERAPY | Facility: HOSPITAL | Age: 54
Setting detail: THERAPIES SERIES
Discharge: HOME | End: 2019-04-01

## 2019-04-01 DIAGNOSIS — Z98.1 S/P LUMBAR FUSION: Primary | ICD-10-CM

## 2019-04-01 DIAGNOSIS — M51.36 DDD (DEGENERATIVE DISC DISEASE), LUMBAR: ICD-10-CM

## 2019-04-01 PROCEDURE — 97140 MANUAL THERAPY 1/> REGIONS: CPT | Performed by: PHYSICAL THERAPIST

## 2019-04-01 NOTE — THERAPY PROGRESS REPORT/RE-CERT
Outpatient Physical Therapy Ortho Progress Note  [facility]     Patient Name: Lashell Zuniga  : 1965  MRN: [id number]  Today's Date: 2019      Visit Date: 2019  Attendance: 10  Subjective % Improvement: had a set back  Recert Date: 19  MD appointment: 19      Visit Dx:    ICD-10-CM ICD-9-CM   1. S/P lumbar fusion Z98.1 V45.4   2. DDD (degenerative disc disease), lumbar M51.36 722.52       Patient Active Problem List   Diagnosis   • Back pain   • DDD (degenerative disc disease), lumbar   • S/P lumbar fusion        Past Medical History:   Diagnosis Date   • Arthritis    • Asthma    • Depression with anxiety    • Diabetes (CMS/HCC)     no longer using medication   • Hypertension    • Kidney disease    • Kidney stone    • Lupus    • Sleep apnea     using c-pap occasionally   • Stomach ulcer    • Thyroid disease         Past Surgical History:   Procedure Laterality Date   • CHOLECYSTECTOMY     • CYSTOSCOPY INSERTION / REMOVAL STENT / STONE     • FOOT SURGERY Right    • GASTRIC BYPASS     • HYSTERECTOMY     • LUMBAR DIRECT LATERAL INTERBODY FUSION N/A 2018    Procedure: LATERAL INTERBODY FUSION LUMBAR THREE-FOUR, LUMBAR FOUR-FIVE, WITH INSTRUMENTATION,INFUSE BONE GRAFT         neurotC-ARM#3);  Surgeon: Matt Salcedo MD;  Location: [facility];  Service: Orthopedic Spine   • TUBAL ABDOMINAL LIGATION         PT Ortho     Row Name 19 4380       Subjective Comments    Subjective Comments  Present today with reports of being ill and having to miss PT. Reports seeing a nurse and being told they think its pneumonia. Reports having a cough and has been doing a lot of sitting and rest and notes back is really painful. Notes feeling more flexible and in hips and thinking the big ball at home has helped back and notes being ill has caused a set back. Notes having right sided low back pain since started coughing  -BB       Precautions and  "Contraindications    Precautions/Limitations  spinal precautions  -BB    Precautions  \"No more than milk jug\"  -BB    Contraindications  L3-4, 4-5 fusion  -BB       Subjective Pain    Able to rate subjective pain?  yes  -BB    Pre-Treatment Pain Level  8  -BB    Post-Treatment Pain Level  6  -BB       Posture/Observations    Posture/Observations Comments  Slow guarded gait with SC. Intermittent cough and wheezing heard.   -BB       Special Tests/Palpation    Special Tests/Palpation  Lumbar/SI  -BB       Lumbar/SI Special Tests    Slump Test (Neural Tension)  Left:;Positive  -BB       Lumbosacral Palpation    Lumbosacral Palpation?  Yes  -BB    Piriformis  Bilateral:;Tender L>R  -BB    Quadratus Lumborum  Bilateral:;Tender L>R   -BB    Erector Spinae (Paraspinals)  Bilateral:;Tender  -BB       General ROM    GENERAL ROM COMMENTS  Trunk NT. left ER 37 right ER 45 of hip   -BB       MMT (Manual Muscle Testing)    General MMT Comments  sitting hip and knee grossly 4+/5 sitting   -BB       Sensation    Sensation WNL?  WFL  -BB    Additional Comments  notes \"lighter\" on left   -BB       Transfers    Comment (Transfers)  Independent but slow to perform   -BB       Gait/Stairs Assessment/Training    Comment (Gait/Stairs)  Ambulating slow and gaurded with SC   -BB      User Key  (r) = Recorded By, (t) = Taken By, (c) = Cosigned By    Initials Name Provider Type    Pavithra Awad PT Physical Therapist                      PT Assessment/Plan     Row Name 04/01/19 8811          PT Assessment    Functional Limitations  Impaired gait;Limitations in functional capacity and performance;Performance in self-care ADL;Limitation in home management  -BB     Impairments  Range of motion;Poor body mechanics;Posture;Pain;Muscle strength;Impaired muscle endurance;Gait;Balance  -BB     Assessment Comments  Patient presents today with a set back due to a illness. Patient prior to illness had been improving per report. Patient has " significant tenderness of left piriformis, tender of incision line, tone and tightness of paraspinals and noted hip tightness limiting functional mobility. Patient demonstrates improved hip mobility today but has more tightness on left with possible neural tension on left. Decreased exercises performed today due to patient not feeling well.   -BB     Rehab Potential  Good  -BB     Patient/caregiver participated in establishment of treatment plan and goals  Yes  -BB     Patient would benefit from skilled therapy intervention  Yes  -BB        PT Plan    PT Frequency  2x/week  -BB     Predicted Duration of Therapy Intervention (Therapy Eval)  4 weeks   -BB     PT Plan Comments  continue POC, progress core stabilization with neutral spine, Hip ROM, manual, stretching   -BB       User Key  (r) = Recorded By, (t) = Taken By, (c) = Cosigned By    Initials Name Provider Type    Pavithra Awad, PT Physical Therapist          Modalities     Row Name 04/01/19 1307             Moist Heat    MH Applied  Yes  -BB      Location  low back seated  -BB      Rx Minutes  10 mins  -BB      MH S/P Rx  Yes  -BB        User Key  (r) = Recorded By, (t) = Taken By, (c) = Cosigned By    Initials Name Provider Type    Pavithra Awad, PT Physical Therapist        Exercises     Row Name 04/01/19 6118             Subjective Comments    Subjective Comments  Present today with reports of being ill and having to miss PT. Reports seeing a nurse and being told they think its pneumonia. Reports having a cough and has been doing a lot of sitting and rest and notes back is really painful. Notes feeling more flexible and in hips and thinking the big ball at home has helped back and notes being ill has caused a set back. Notes having right sided low back pain since started coughing  -BB         Subjective Pain    Able to rate subjective pain?  yes  -BB      Pre-Treatment Pain Level  8  -BB      Post-Treatment Pain Level  6  -BB         Exercise 1     Exercise Name 1  Recheck   -BB         Exercise 2    Exercise Name 2  Manual hip ER/IR stretch, manual HS stretch bilateral, left inferior hip mobe grade 2 and grade 1 lateral hip mobe left   -BB      Time 2  15'   -BB         Exercise 3    Exercise Name 3  Sitting MHP   -BB      Time 3  10'  -BB        User Key  (r) = Recorded By, (t) = Taken By, (c) = Cosigned By    Initials Name Provider Type    Pavithra Awad PT Physical Therapist                       PT OP Goals     Row Name 04/01/19 1304          PT Short Term Goals    STG Date to Achieve  04/22/19  -BB     STG 1  Independent in HEP   -BB     STG 1 Progress  Ongoing;Not Met  -BB     STG 2  Hip flexion/abduction MMT sitting 5/5   -BB     STG 2 Progress  Ongoing;Not Met  -BB     STG 3  Ambulate safely with no AD and complete 6' walk test   -BB     STG 3 Progress  Ongoing;Not Met  -BB     STG 4  Be able to don/doff shoes independently   -BB     STG 4 Progress  Ongoing;Not Met donned shoes but unable to tie  -BB        Time Calculation    PT Goal Re-Cert Due Date  04/22/19  -BB       User Key  (r) = Recorded By, (t) = Taken By, (c) = Cosigned By    Initials Name Provider Type    Pavithra Awad PT Physical Therapist          Therapy Education  Education Details: Sciatic nerve stretch left   Given: HEP  Program: New  How Provided: Verbal  Provided to: Patient  Level of Understanding: Verbalized    Outcome Measure Options: Modifed Owestry  Modified Oswestry  Modified Oswestry Score/Comments: 58%      Time Calculation:   Start Time: 1304  Stop Time: 1353  Time Calculation (min): 49 min  Therapy Charges for Today     Code Description Service Date Service Provider Modifiers Qty    00544493186 HC PT MANUAL THERAPY EA 15 MIN 4/1/2019 Pavithra Worthy, PT GP 1    98719720053 HC PT THER SUPP EA 15 MIN 4/1/2019 Pavithra Worthy, PT GP 2          PT G-Codes  Outcome Measure Options: Modifed Owestry  Modified Oswestry Score/Comments: 58%         Pavithra Worthy  PT  4/1/2019

## 2019-04-03 ENCOUNTER — APPOINTMENT (OUTPATIENT)
Dept: PHYSICAL THERAPY | Facility: HOSPITAL | Age: 54
End: 2019-04-03

## 2019-04-08 ENCOUNTER — APPOINTMENT (OUTPATIENT)
Dept: PHYSICAL THERAPY | Facility: HOSPITAL | Age: 54
End: 2019-04-08

## 2019-04-11 ENCOUNTER — APPOINTMENT (OUTPATIENT)
Dept: PHYSICAL THERAPY | Facility: HOSPITAL | Age: 54
End: 2019-04-11

## 2019-04-18 DIAGNOSIS — M51.36 DDD (DEGENERATIVE DISC DISEASE), LUMBAR: Primary | ICD-10-CM

## 2019-04-19 ENCOUNTER — OFFICE VISIT (OUTPATIENT)
Dept: ORTHOPEDIC SURGERY | Facility: CLINIC | Age: 54
End: 2019-04-19

## 2019-04-19 VITALS — BODY MASS INDEX: 38.32 KG/M2 | HEIGHT: 65 IN | WEIGHT: 230 LBS

## 2019-04-19 DIAGNOSIS — M51.36 DDD (DEGENERATIVE DISC DISEASE), LUMBAR: Primary | ICD-10-CM

## 2019-04-19 DIAGNOSIS — Z98.1 S/P LUMBAR FUSION: ICD-10-CM

## 2019-04-19 PROCEDURE — 99213 OFFICE O/P EST LOW 20 MIN: CPT | Performed by: ORTHOPAEDIC SURGERY

## 2019-04-19 NOTE — PROGRESS NOTES
"Lashell Zuniga is a 53 y.o. female returns for     Chief Complaint   Patient presents with   • Lumbar Spine - Follow-up     X-rays done today in officeHISTORY OF PRESENT ILLNESS: follow up lumbar fusion.  Pain scale today 6/10 Patient states she has had infection and has been coughing so much making her back hurt   Continues taking norco 3 times per day, prescribed by primary care.,  States she had pneumonia recently, states she is just now getting over this.  Pain is well controlled       CONCURRENT MEDICAL HISTORY:    The following portions of the patient's history were reviewed and updated as appropriate: allergies, current medications, past family history, past medical history, past social history, past surgical history and problem list.     ROS  No fevers or chills.  No chest pain or shortness of air.  No GI or  disturbances.    PHYSICAL EXAMINATION:       Ht 165.1 cm (65\")   Wt 104 kg (230 lb)   BMI 38.27 kg/m²     Physical Exam    GAIT:     []  Normal  [x]  Antalgic    Assistive device: []  None  []  Walker     []  Crutches  [x]  Cane     []  Wheelchair  []  Stretcher    Ortho Exam    Ambulating with cane  Incision is fully healed  No erythema, no mass no drainage  5/5 right and left hip flexor quadricep tibialis anterior extensor hallucis longus and gastrocnemius  Sensation intact to touch.          Xr Spine Lumbar 2 Or 3 View    Result Date: 4/19/2019  Narrative: Ordering Provider:  Matt Salcedo MD Ordering Diagnosis/Indication:  DDD (degenerative disc disease), lumbar Procedure:  XR SPINE LUMBAR 2 OR 3 VW Exam Date:  4/19/19 RELEVANT PRIOR IMAGES:  XR Spine Lumbar 2 or 3 View 01/09/2019 [id number] Final COMPARISON:  Todays X-rays were compared to previous images dated 1/9/2019.     Impression:  lumbar interbody fusion of L3-4 and L4-5, with instrumentation in expectant position, good alignment, bone quality is good, interbody instrumentation in excellent position Bone quality: good " Alignment: lumbar alignment is good Fracture: none Soft tissue: normal paravertebral soft tissues.             ASSESSMENT:    Diagnoses and all orders for this visit:    DDD (degenerative disc disease), lumbar    S/P lumbar fusion          PLAN    Reviewed xrays with her.  Fusion alignment is very good.  Follow up in 2 -3 months  Good post operative course for her.        Matt Salcedo MD

## 2019-06-19 ENCOUNTER — OFFICE VISIT (OUTPATIENT)
Dept: ORTHOPEDIC SURGERY | Facility: CLINIC | Age: 54
End: 2019-06-19

## 2019-06-19 VITALS — WEIGHT: 231.1 LBS | BODY MASS INDEX: 38.51 KG/M2 | HEIGHT: 65 IN

## 2019-06-19 DIAGNOSIS — M54.9 BACK PAIN, UNSPECIFIED BACK LOCATION, UNSPECIFIED BACK PAIN LATERALITY, UNSPECIFIED CHRONICITY: ICD-10-CM

## 2019-06-19 DIAGNOSIS — M51.36 DDD (DEGENERATIVE DISC DISEASE), LUMBAR: Primary | ICD-10-CM

## 2019-06-19 DIAGNOSIS — Z98.1 S/P LUMBAR FUSION: ICD-10-CM

## 2019-06-19 PROCEDURE — 99213 OFFICE O/P EST LOW 20 MIN: CPT | Performed by: ORTHOPAEDIC SURGERY

## 2019-06-19 NOTE — PROGRESS NOTES
"Lashell Zuniga is a 53 y.o. female returns for     Chief Complaint   Patient presents with   • Lumbar Spine - Follow-up       HISTORY OF PRESENT ILLNESS:  F/u lumbar fusion, patient states that pain score is at a 3 today,       53 y complains of midback pain, described as pressure, states low back is doing well, she is pleased with post operative course.  Otherwise she is doing well.  No radiating pain  No numbness or tingling.       CONCURRENT MEDICAL HISTORY:    The following portions of the patient's history were reviewed and updated as appropriate: allergies, current medications, past family history, past medical history, past social history, past surgical history and problem list.     ROS  No fevers or chills.  No chest pain or shortness of air.  No GI or  disturbances.    PHYSICAL EXAMINATION:       Ht 165.1 cm (65\")   Wt 105 kg (231 lb 1.6 oz)   BMI 38.46 kg/m²     Physical Exam    GAIT:     []  Normal  []  Antalgic    Assistive device: []  None  []  Walker     []  Crutches  []  Cane     []  Wheelchair  []  Stretcher    Ortho Exam    Ambulates normally  Incision healed, no mass, no erythema  5/5 right and left hip flexor quad tibialis anterior extensor hallucis longus  Forward flexion 60  Extension 10        No results found.          ASSESSMENT:    Diagnoses and all orders for this visit:    DDD (degenerative disc disease), lumbar    S/P lumbar fusion    Back pain, unspecified back location, unspecified back pain laterality, unspecified chronicity          PLAN       Continue present course, she has done very well post operative lumbar fusions of L3-4 and L4-5  Xray thoracic and cervical spine      Matt Salcedo MD    "

## 2019-07-19 DIAGNOSIS — M54.9 BACK PAIN, UNSPECIFIED BACK LOCATION, UNSPECIFIED BACK PAIN LATERALITY, UNSPECIFIED CHRONICITY: ICD-10-CM

## 2019-07-19 DIAGNOSIS — M51.36 DDD (DEGENERATIVE DISC DISEASE), LUMBAR: Primary | ICD-10-CM

## 2019-07-22 ENCOUNTER — OFFICE VISIT (OUTPATIENT)
Dept: ORTHOPEDIC SURGERY | Facility: CLINIC | Age: 54
End: 2019-07-22

## 2019-07-22 VITALS — BODY MASS INDEX: 38.32 KG/M2 | WEIGHT: 230 LBS | HEIGHT: 65 IN

## 2019-07-22 DIAGNOSIS — M51.36 DDD (DEGENERATIVE DISC DISEASE), LUMBAR: Primary | ICD-10-CM

## 2019-07-22 DIAGNOSIS — M54.9 BACK PAIN, UNSPECIFIED BACK LOCATION, UNSPECIFIED BACK PAIN LATERALITY, UNSPECIFIED CHRONICITY: ICD-10-CM

## 2019-07-22 DIAGNOSIS — Z98.1 S/P LUMBAR FUSION: ICD-10-CM

## 2019-07-22 PROCEDURE — 99213 OFFICE O/P EST LOW 20 MIN: CPT | Performed by: ORTHOPAEDIC SURGERY

## 2019-07-22 NOTE — PROGRESS NOTES
"Lashell Zuniga is a 53 y.o. female returns for     Chief Complaint   Patient presents with   • Lumbar Spine - Follow-up       HISTORY OF PRESENT ILLNESS: f/u back pain, xrays done today.      53 y with complaints of back pain, states the pain radiates into the arms.  She takes norco for pain 3 times per day.  This is prescribed by her family nurse.  The pain is a dull pain, occurs daily intermittently.  No fevers or chills  No nighttime awakneing pain          CONCURRENT MEDICAL HISTORY:    The following portions of the patient's history were reviewed and updated as appropriate: allergies, current medications, past family history, past medical history, past social history, past surgical history and problem list.     ROS  No fevers or chills.  No chest pain or shortness of air.  No GI or  disturbances.    Past Medical History:   Diagnosis Date   • Arthritis    • Asthma    • Depression with anxiety    • Diabetes (CMS/HCC)     no longer using medication   • Hypertension    • Kidney disease    • Kidney stone    • Lupus    • Sleep apnea     using c-pap occasionally   • Stomach ulcer    • Thyroid disease      Past Surgical History:   Procedure Laterality Date   • CHOLECYSTECTOMY  1997   • CYSTOSCOPY INSERTION / REMOVAL STENT / STONE  2015   • FOOT SURGERY Right 1998   • GASTRIC BYPASS  2016   • HYSTERECTOMY  2006   • LUMBAR DIRECT LATERAL INTERBODY FUSION N/A 12/11/2018    Procedure: LATERAL INTERBODY FUSION LUMBAR THREE-FOUR, LUMBAR FOUR-FIVE, WITH INSTRUMENTATION,INFUSE BONE GRAFT         neurotC-ARM#3);  Surgeon: Matt Salcedo MD;  Location: [facility];  Service: Orthopedic Spine   • TUBAL ABDOMINAL LIGATION  1989     PHYSICAL EXAMINATION:       Ht 165.1 cm (65\")   Wt 104 kg (230 lb)   BMI 38.27 kg/m²     Physical Exam   Constitutional: She is oriented to person, place, and time. She appears well-developed and well-nourished. No distress.   HENT:   Head: Normocephalic.   Right Ear: External ear " normal.   Left Ear: External ear normal.   Mouth/Throat: No oropharyngeal exudate.   Eyes: EOM are normal. Pupils are equal, round, and reactive to light. Right eye exhibits no discharge. Left eye exhibits no discharge. No scleral icterus.   Neck: Normal range of motion. No JVD present. No tracheal deviation present. No thyromegaly present.   Cardiovascular: Normal rate and intact distal pulses. Exam reveals no gallop.   Pulmonary/Chest: Effort normal and breath sounds normal. No respiratory distress. She has no wheezes. She has no rales. She exhibits no tenderness.   Abdominal: Soft. Bowel sounds are normal. She exhibits no distension and no mass. There is no tenderness. There is no guarding.   Musculoskeletal: Normal range of motion. She exhibits no edema or deformity.        Thoracic back: She exhibits normal range of motion, no tenderness, no bony tenderness, no swelling, no edema, no deformity, no laceration, no pain, no spasm and normal pulse.        Lumbar back: Normal. She exhibits normal range of motion, no tenderness, no bony tenderness, no swelling, no edema, no deformity, no laceration, no pain, no spasm and normal pulse.   Lymphadenopathy:     She has no cervical adenopathy.   Neurological: She is alert and oriented to person, place, and time. She has normal reflexes. She displays normal reflexes. No cranial nerve deficit. She exhibits normal muscle tone. Coordination normal.   Skin: Skin is warm and dry. Capillary refill takes less than 2 seconds. No rash noted. She is not diaphoretic. No erythema.   Psychiatric: She has a normal mood and affect. Her behavior is normal. Thought content normal.       GAIT:     []  Normal  []  Antalgic    Assistive device: [x]  None  []  Walker     []  Crutches  []  Cane     []  Wheelchair  []  Stretcher    Back Exam     Tenderness   The patient is experiencing tenderness in the cervical and thoracic.    Range of Motion   Extension: 10   Flexion: 30     Other    Sensation: normal          5/5 right and left deltoid bicep tricep wrist flexion and extension and hand .      Xr Spine Cervical 2 Or 3 View    Result Date: 7/22/2019  Narrative: Ordering Provider:  Matt Salcedo MD Ordering Diagnosis/Indication:  DDD (degenerative disc disease), lumbar, Back pain, unspecified back location, unspecified back pain laterality, unspecified chronicity Procedure:  XR SPINE CERVICAL 2 OR 3 VW Exam Date:  7/22/19 RELEVANT PRIOR IMAGES:  XR Spine Lumbar 2 or 3 View 04/19/2019 [id number] Final COMPARISON:  Not applicable, no relevant images available.     Impression:  cervical alignment is normal, cervical disc heights are good, bone quality is good, cervical lordosis is good Bone quality: good Alignment:  Cervical lordosis is good Fracture: none Soft tissue: normal.     Xr Spine Thoracic 2 View    Result Date: 7/22/2019  Narrative: Ordering Provider:  Matt Salcedo MD Ordering Diagnosis/Indication:  DDD (degenerative disc disease), lumbar, Back pain, unspecified back location, unspecified back pain laterality, unspecified chronicity Procedure:  XR SPINE THORACIC 2 VW Exam Date:  7/22/19 RELEVANT PRIOR IMAGES:  XR Spine Cervical 2 or 3 View 07/22/2019 [id number] Final COMPARISON:  Not applicable, no relevant images available.     Impression:  thoracic alignment on the anteroposterior plane reveals early scoliosis, there are degenerative changes loss of disc height of T9-10, T10-11, there are endplate osteophytes present T9-10 and T10-11 Bone quality: good Alignment: thoracic alignment early scoliosis Fracture: none Soft tissue: normal             ASSESSMENT:    Diagnoses and all orders for this visit:    DDD (degenerative disc disease), lumbar    Back pain, unspecified back location, unspecified back pain laterality, unspecified chronicity    S/P lumbar fusion    Other orders  -     ESTRADIOL ACETATE PO; Take  by mouth.          PLAN      I offered physical  therapy which she declines today.  Discussed dispensing of the zanaflex during the day.  I discussed glucosamine and chondroitin supplements.        Matt Salcedo MD

## 2022-08-15 ENCOUNTER — TRANSCRIBE ORDERS (OUTPATIENT)
Dept: PHYSICAL THERAPY | Facility: HOSPITAL | Age: 57
End: 2022-08-15

## 2022-08-15 ENCOUNTER — HOSPITAL ENCOUNTER (OUTPATIENT)
Dept: PHYSICAL THERAPY | Facility: HOSPITAL | Age: 57
Setting detail: THERAPIES SERIES
Discharge: HOME | End: 2022-08-15

## 2022-08-15 DIAGNOSIS — M54.40 LOW BACK PAIN WITH SCIATICA, SCIATICA LATERALITY UNSPECIFIED, UNSPECIFIED BACK PAIN LATERALITY, UNSPECIFIED CHRONICITY: Primary | ICD-10-CM

## 2022-08-15 DIAGNOSIS — M53.3 SI (SACROILIAC) JOINT DYSFUNCTION: Primary | ICD-10-CM

## 2022-08-15 PROCEDURE — 97110 THERAPEUTIC EXERCISES: CPT | Performed by: PHYSICAL THERAPIST

## 2022-08-15 PROCEDURE — 97162 PT EVAL MOD COMPLEX 30 MIN: CPT | Performed by: PHYSICAL THERAPIST

## 2022-08-15 NOTE — THERAPY EVALUATION
Outpatient Physical Therapy Ortho Initial Evaluation  [facility]     Patient Name: Lashell Hazel  : 1965  MRN: [id number]  Today's Date: 8/15/2022      Visit Date: 08/15/2022  Visit   Return to MD: WANDA  Re-certification date: 22  Patient Active Problem List   Diagnosis   • Back pain   • DDD (degenerative disc disease), lumbar   • S/P lumbar fusion        Past Medical History:   Diagnosis Date   • Arthritis    • Asthma    • COPD (chronic obstructive pulmonary disease) ([facility])    • Depression with anxiety    • Diabetes ([facility])     no longer using medication   • Hypertension    • Kidney disease    • Kidney stone    • Lupus (HCC)    • Sleep apnea     using c-pap occasionally   • Stomach ulcer    • Thyroid disease         Past Surgical History:   Procedure Laterality Date   • CHOLECYSTECTOMY     • CYSTOSCOPY INSERTION / REMOVAL STENT / STONE     • FOOT SURGERY Right    • GASTRIC BYPASS     • HYSTERECTOMY     • LUMBAR DIRECT LATERAL INTERBODY FUSION N/A 2018    Procedure: LATERAL INTERBODY FUSION LUMBAR THREE-FOUR, LUMBAR FOUR-FIVE, WITH INSTRUMENTATION,INFUSE BONE GRAFT         neurotC-ARM#3);  Surgeon: Matt Salcedo MD;  Location: [facility];  Service: Orthopedic Spine   • TUBAL ABDOMINAL LIGATION         Visit Dx:     ICD-10-CM ICD-9-CM   1. SI (sacroiliac) joint dysfunction  M53.3 724.6     Medications (Admitted on 8/15/2022)       albuterol 108 (90 Base) MCG/ACT inhaler    Calcium-Vitamin D-Vitamin K (VIACTIV) 500-500-40 MG-UNT-MCG chewable tablet    docusate sodium (COLACE) 100 MG capsule    ESTRADIOL ACETATE PO    methocarbamol (ROBAXIN) 750 MG tablet    oxyCODONE-acetaminophen (PERCOCET) 7.5-325 MG per tablet    raNITIdine (ZANTAC) 150 MG tablet    spironolactone (ALDACTONE) 25 MG tablet    tiZANidine (ZANAFLEX) 4 MG tablet     Allergies        Influenza VaccinesNausea Only, Swelling   Pneumococcal VaccinesNausea Only,  Swelling   CorticosteroidsNausea Only, Rash       PT Ortho     Row Name 08/15/22 0938       Subjective Comments    Subjective Comments 57 yo female with chronic SI joint dysfunction for the past 6 months. Has had 10 falls over the past 6 months, her legs mostly give way preceding the falls. Has low back pain as well and L>R LE sciatica desribed as burning sensation, constant symptoms. H/o lumbar fusion, L3-4, and L4-5 on 12/11/18. Worsening pelvic pain over the past 6 months. Aggravating factors: going down>up stairs, sleeping, sitting, walking, driving long distances, bending over. Easing factors: heat/shower, TENS unit, meds-Norco. Occupation: on disability. , lives with  in [place]. 5 step entry, handrail on R side going up.  -BS       Precautions and Contraindications    Contraindications L3-4, 4-5 fusion  -BS       Subjective Pain    Able to rate subjective pain? yes  -BS    Pre-Treatment Pain Level 8  -BS    Post-Treatment Pain Level 9  -BS       Posture/Observations    Posture/Observations Comments L SI rotation  -BS       Quarter Clearing    Quarter Clearing Lower Quarter Clearing  -BS       Neural Tension Signs- Lower Quarter Clearing    SLR Bilateral:;Positive  -BS       Sensory Screen for Light Touch- Lower Quarter Clearing    L1 (inguinal area) Bilateral:;Intact  -BS    L2 (anterior mid thigh) Bilateral:;Intact  -BS    L3 (distal anterior thigh) Bilateral:;Intact  -BS    L4 (medial lower leg/foot) Bilateral:;Intact  -BS    L5 (lateral lower leg/great toe) Bilateral:;Diminished  numb/tingling R great toe and L lateral lower leg  -BS    S1 (bottom of foot) Right:;Diminished;Left:;Intact  L plantar surface of the foot  -BS       Myotomal Screen- Lower Quarter Clearing    Hip flexion (L2) Right:;5 (Normal);Left:;4+ (Good +)  -BS    Knee extension (L3) Right:;4- (Good -);Left:;3+ (Fair +)  -BS    Ankle DF (L4) Bilateral:;5 (Normal)  -BS    Great toe extension (L5) Bilateral:;5 (Normal)   -BS    Ankle PF (S1) Bilateral:;5 (Normal)  -BS    Knee flexion (S2) Right:;5 (Normal);Left:;4 (Good)  -BS       Lumbar ROM Screen- Lower Quarter Clearing    Lumbar Flexion Impaired  fingers to knee level-50% mod limit  -BS    Lumbar Extension Impaired  25% severe limit  -BS    Lumbar Lateral Flexion Normal  -BS    Lumbar Rotation Impaired  50% mod limit-bilat  -BS       SI/Hip Screen- Lower Quarter Clearing    Eladia's/Lorenzo's test Bilateral:;Positive  -BS       Flexibility    Flexibility Tested? Lower Extremity  -BS       Lower Extremity Flexibility    Hamstrings Bilateral:;Mildly limited  -BS    Hip External Rotators Bilateral:;Moderately limited  -BS    Hip Internal Rotators Bilateral:;WNL  -BS          User Key  (r) = Recorded By, (t) = Taken By, (c) = Cosigned By    Initials Name Provider Type    James Gonzalez, PT Physical Therapist                                   PT OP Goals     Row Name 08/15/22 1000          PT Short Term Goals    STG Date to Achieve 08/29/22  -BS     STG 1 Improve lumbar spine AROM (flex/ext) to minimal limit 75%  -BS     STG 1 Progress New  -BS     STG 2 Improve B LE MMT (hip flex/knee ext) to 5/5  -BS     STG 2 Progress New  -BS     STG 3 Reduce SI joint pain by 25%  -BS     STG 3 Progress New  -BS            Long Term Goals    LTG Date to Achieve 09/12/22  -BS     LTG 1 Reduce SI joint pain by 50%  -BS     LTG 1 Progress New  -BS            Time Calculation    PT Goal Re-Cert Due Date 09/05/22  -BS           User Key  (r) = Recorded By, (t) = Taken By, (c) = Cosigned By    Initials Name Provider Type    James Gonzalez, PT Physical Therapist                 PT Assessment/Plan     Row Name 08/15/22 0938          PT Assessment    Functional Limitations Impaired gait;Performance in work activities;Performance in sport activities;Performance in self-care ADL;Performance in leisure activities;Limitation in home management  -BS     Impairments Balance;Impaired flexibility;Muscle  strength;Pain;Posture;Range of motion;Sensation;Endurance  -BS     Assessment Comments 55 yo female with chronic SI joint dysfunction with a h/o old multilevel lumbar fusion in 2018. Presents with a L SI rotation, B hip ER tighteness>B HS tightness, muscle guarding and possible directional preference into lumbar extension. Will address SI alignment issues, LE flexibility, core trunk instability and lumbar paraspinal tightness in efforts at resuming PLOF.  -BS     Please refer to paper survey for additional self-reported information No  -BS     Rehab Potential Fair  -BS     Patient/caregiver participated in establishment of treatment plan and goals Yes  -BS     Patient would benefit from skilled therapy intervention Yes  -BS            PT Plan    PT Frequency 2x/week  -BS     Predicted Duration of Therapy Intervention (PT) 3-4 weeks then TBD  -BS     Planned CPT's? PT EVAL MOD COMPLELITY: 72389;PT RE-EVAL: 37163;PT THER PROC EA 15 MIN: 61689;PT THER ACT EA 15 MIN: 53240;PT MANUAL THERAPY EA 15 MIN: 10328;PT NEUROMUSC RE-EDUCATION EA 15 MIN: 18945;PT GAIT TRAINING EA 15 MIN: 94712;PT HOT OR COLD PACK TREAT MCARE;PT ELECTRICAL STIM UNATTEND: ;PT ULTRASOUND EA 15 MIN: 94440;PT TRACTION LUMBAR: 57390;PT THER SUPP EA 15 MIN  -BS     Physical Therapy Interventions (Optional Details) aquatics exercise;balance training;home exercise program;joint mobilization;lumbar stabilization;manual therapy techniques;modalities;patient/family education;ROM (Range of Motion);strengthening;stretching;swiss ball techniques;transfer training;postural re-education;neuromuscular re-education  -BS     PT Plan Comments Address HS, piriformis tightness, assess for trigger points along B QL and piriformis regions. Add core stability.  -BS           User Key  (r) = Recorded By, (t) = Taken By, (c) = Cosigned By    Initials Name Provider Type    James Gonzalez, PT Physical Therapist                   OP Exercises     Row Name 08/15/22 0959  "            Precautions    Existing Precautions/Restrictions no known precautions/restrictions  -BS              Subjective Comments    Subjective Comments 55 yo female with chronic SI joint dysfunction for the past 6 months. Has had 10 falls over the past 6 months, her legs mostly give way preceding the falls. Has low back pain as well and L>R LE sciatica desribed as burning sensation, constant symptoms. H/o lumbar fusion, L3-4, and L4-5 on 12/11/18. Worsening pelvic pain over the past 6 months. Aggravating factors: going down>up stairs, sleeping, sitting, walking, driving long distances, bending over. Easing factors: heat/shower, TENS unit, meds-Norco. Occupation: on disability. , lives with  in [place]. 5 step entry, handrail on R side going up.  -BS              Subjective Pain    Able to rate subjective pain? yes  -BS      Pre-Treatment Pain Level 8  -BS      Post-Treatment Pain Level 9  -BS              Exercise 1    Exercise Name 1 prone on elbows, pillow under pelvis  -BS      Time 1 5'  -BS              Exercise 2    Exercise Name 2 supine B piriformis S  -BS      Sets 2 1  -BS      Reps 2 1  -BS      Time 2 30\" hold  -BS              Exercise 3    Exercise Name 3 pt ed sitting posture  -BS      Time 3 3'  -BS            User Key  (r) = Recorded By, (t) = Taken By, (c) = Cosigned By    Initials Name Provider Type    BS James Martinez, PT Physical Therapist                                        Time Calculation:     Start Time: 0938  Stop Time: 1022  Time Calculation (min): 44 min  Total Timed Code Minutes- PT: 44 minute(s)     Therapy Charges for Today     Code Description Service Date Service Provider Modifiers Qty    39313538750 HC PT EVAL MOD COMPLEXITY 3 8/15/2022 James Martinez, PT GP 1    72618712870 HC PT THER PROC EA 15 MIN 8/15/2022 James Martinez, PT GP 1                    James Martinez, PT  8/15/2022      "

## 2022-08-19 ENCOUNTER — HOSPITAL ENCOUNTER (OUTPATIENT)
Dept: PHYSICAL THERAPY | Facility: HOSPITAL | Age: 57
Setting detail: THERAPIES SERIES
Discharge: HOME | End: 2022-08-19

## 2022-08-19 DIAGNOSIS — M53.3 SI (SACROILIAC) JOINT DYSFUNCTION: Primary | ICD-10-CM

## 2022-08-19 PROCEDURE — G0283 ELEC STIM OTHER THAN WOUND: HCPCS | Performed by: PHYSICAL THERAPIST

## 2022-08-19 PROCEDURE — 97110 THERAPEUTIC EXERCISES: CPT | Performed by: PHYSICAL THERAPIST

## 2022-08-19 NOTE — THERAPY TREATMENT NOTE
Outpatient Physical Therapy Ortho Treatment Note  [facility]     Patient Name: Lashell Hazel  : 1965  MRN: [id number]  Today's Date: 2022      Visit Date: 2022  Visit   Return to MD: WANDA  Re-certification date: 22  Visit Dx:    ICD-10-CM ICD-9-CM   1. SI (sacroiliac) joint dysfunction  M53.3 724.6       Patient Active Problem List   Diagnosis   • Back pain   • DDD (degenerative disc disease), lumbar   • S/P lumbar fusion        Past Medical History:   Diagnosis Date   • Arthritis    • Asthma    • COPD (chronic obstructive pulmonary disease) ([facility])    • Depression with anxiety    • Diabetes ([facility])     no longer using medication   • Hypertension    • Kidney disease    • Kidney stone    • Lupus (HCC)    • Sleep apnea     using c-pap occasionally   • Stomach ulcer    • Thyroid disease         Past Surgical History:   Procedure Laterality Date   • CHOLECYSTECTOMY     • CYSTOSCOPY INSERTION / REMOVAL STENT / STONE     • FOOT SURGERY Right    • GASTRIC BYPASS     • HYSTERECTOMY     • LUMBAR DIRECT LATERAL INTERBODY FUSION N/A 2018    Procedure: LATERAL INTERBODY FUSION LUMBAR THREE-FOUR, LUMBAR FOUR-FIVE, WITH INSTRUMENTATION,INFUSE BONE GRAFT         neurotC-ARM#3);  Surgeon: Matt Salcedo MD;  Location: [facility];  Service: Orthopedic Spine   • TUBAL ABDOMINAL LIGATION          PT Ortho     Row Name 22 0807       Subjective Comments    Subjective Comments Patient had 2 falls this week since the PT eval, one Tuesday 3 days ago and one 2 days ago. Her legs lock up and she fell on her front steps. She hit the side of the house and the railing. Worsening sacral pain now right above her coccyx.  -BS       Precautions and Contraindications    Contraindications s/p L3-4, L4-5 fusion  -BS       Subjective Pain    Able to rate subjective pain? yes  -BS    Pre-Treatment Pain Level 10  -BS    Post-Treatment Pain Level 3  -BS          User Key  (r)  = Recorded By, (t) = Taken By, (c) = Cosigned By    Initials Name Provider Type    James Gonzalez, PT Physical Therapist                             PT Assessment/Plan     Row Name 08/19/22 0807          PT Assessment    Assessment Comments Good tolerance to piriformis stretching in combination with repeated movement testing into lumbar flexion.  -BS     Rehab Potential Fair  -BS     Patient/caregiver participated in establishment of treatment plan and goals Yes  -BS     Patient would benefit from skilled therapy intervention Yes  -BS            PT Plan    PT Frequency 2x/week  -BS     Predicted Duration of Therapy Intervention (PT) 3-4 weeks then TBD  -BS     PT Plan Comments advance LE stretching, instruct in QL stretch. Assess for QL tightness/tenderness.  -BS           User Key  (r) = Recorded By, (t) = Taken By, (c) = Cosigned By    Initials Name Provider Type    James Gonzalez, PT Physical Therapist                 Modalities     Row Name 08/19/22 0807             Ice    Ice Applied Yes  -BS      Location lumbosacral region, in slidelying  -BS      PT Ice Rx Minutes 15  -BS      Ice S/P Rx Yes  -BS              ELECTRICAL STIMULATION    Attended/Unattended Unattended  -BS      Stimulation Type IFC  -BS      Location/Electrode Placement/Other lumbosacral region  -BS      PT E-Stim Unattended Minutes 15  -BS            User Key  (r) = Recorded By, (t) = Taken By, (c) = Cosigned By    Initials Name Provider Type    James Gonzalez, PT Physical Therapist               OP Exercises     Row Name 08/19/22 0819             Precautions    Existing Precautions/Restrictions no known precautions/restrictions  -BS              Subjective Comments    Subjective Comments Patient had 2 falls this week since the PT eval, one Tuesday 3 days ago and one 2 days ago. Her legs lock up and she fell on her front steps. She hit the side of the house and the railing. Worsening sacral pain now right above her coccyx.  -BS         "      Subjective Pain    Able to rate subjective pain? yes  -BS      Pre-Treatment Pain Level 10  -BS      Post-Treatment Pain Level 3  -BS              Exercise 1    Exercise Name 1 Pro II, L2  -BS      Time 1 8'  -BS              Exercise 2    Exercise Name 2 supine B piriformis S  -BS      Sets 2 1  -BS      Reps 2 3  -BS      Time 2 30\" hold  -BS              Exercise 3    Exercise Name 3 RFISItting  -BS      Sets 3 1  -BS      Reps 3 10  -BS              Exercise 4    Exercise Name 4 RFISitting + self OP  -BS      Sets 4 1x10  -BS      Reps 4 1x5  -BS      Additional Comments reduced sacral pain  -BS              Exercise 5    Exercise Name 5 RFISitting + clinician OP  -BS      Sets 5 1  -BS      Reps 5 5  -BS              Exercise 6    Exercise Name 6 see modalities  -BS              Exercise 7    Exercise Name 7 MET to realign rotated SI joint  -BS      Time 7 2 mins  -BS            User Key  (r) = Recorded By, (t) = Taken By, (c) = Cosigned By    Initials Name Provider Type    BS James Martinez, PT Physical Therapist                              PT OP Goals     Row Name 08/19/22 0807          PT Short Term Goals    STG Date to Achieve 08/29/22  -BS     STG 1 Improve lumbar spine AROM (flex/ext) to minimal limit 75%  -BS     STG 1 Progress New  -BS     STG 2 Improve B LE MMT (hip flex/knee ext) to 5/5  -BS     STG 2 Progress New  -BS     STG 3 Reduce SI joint pain by 25%  -BS     STG 3 Progress New  -BS            Long Term Goals    LTG Date to Achieve 09/12/22  -BS     LTG 1 Reduce SI joint pain by 50%  -BS     LTG 1 Progress New  -BS            Time Calculation    PT Goal Re-Cert Due Date 09/05/22  -BS           User Key  (r) = Recorded By, (t) = Taken By, (c) = Cosigned By    Initials Name Provider Type    James Gonzalez, PT Physical Therapist                               Time Calculation:   Start Time: 0807  Stop Time: 0905  Time Calculation (min): 58 min  PT Non-Billable Time (min): 15 min  Total " Timed Code Minutes- PT: 43 minute(s)  Untimed Charges  PT E-Stim Unattended Minutes: 15  PT Ice Rx Minutes: 15  Total Minutes  Untimed Charges Total Minutes: 30   Total Minutes: 30  Therapy Charges for Today     Code Description Service Date Service Provider Modifiers Qty    42486720092 HC PT THER PROC EA 15 MIN 8/19/2022 James Martinez, PT GP 3    12629995298 HC PT ELECTRICAL STIM UNATTENDED 8/19/2022 James Martinez, PT  1                    James Martinez, PT  8/19/2022

## 2022-08-22 ENCOUNTER — HOSPITAL ENCOUNTER (OUTPATIENT)
Dept: PHYSICAL THERAPY | Facility: HOSPITAL | Age: 57
Setting detail: THERAPIES SERIES
Discharge: HOME | End: 2022-08-22

## 2022-08-22 DIAGNOSIS — M53.3 SI (SACROILIAC) JOINT DYSFUNCTION: Primary | ICD-10-CM

## 2022-08-22 PROCEDURE — 97110 THERAPEUTIC EXERCISES: CPT | Performed by: PHYSICAL THERAPIST

## 2022-08-22 PROCEDURE — 97140 MANUAL THERAPY 1/> REGIONS: CPT | Performed by: PHYSICAL THERAPIST

## 2022-08-22 NOTE — THERAPY TREATMENT NOTE
Outpatient Physical Therapy Ortho Treatment Note  [facility]     Patient Name: Lashell Hazel  : 1965  MRN: [id number]  Today's Date: 2022      Visit Date: 2022  Visit 3/3  Return to MD: WANDA  Re-certification date: 22    Visit Dx:    ICD-10-CM ICD-9-CM   1. SI (sacroiliac) joint dysfunction  M53.3 724.6       Patient Active Problem List   Diagnosis   • Back pain   • DDD (degenerative disc disease), lumbar   • S/P lumbar fusion        Past Medical History:   Diagnosis Date   • Arthritis    • Asthma    • COPD (chronic obstructive pulmonary disease) ([facility])    • Depression with anxiety    • Diabetes ([facility])     no longer using medication   • Hypertension    • Kidney disease    • Kidney stone    • Lupus (HCC)    • Sleep apnea     using c-pap occasionally   • Stomach ulcer    • Thyroid disease         Past Surgical History:   Procedure Laterality Date   • CHOLECYSTECTOMY     • CYSTOSCOPY INSERTION / REMOVAL STENT / STONE     • FOOT SURGERY Right    • GASTRIC BYPASS     • HYSTERECTOMY     • LUMBAR DIRECT LATERAL INTERBODY FUSION N/A 2018    Procedure: LATERAL INTERBODY FUSION LUMBAR THREE-FOUR, LUMBAR FOUR-FIVE, WITH INSTRUMENTATION,INFUSE BONE GRAFT         neurotC-ARM#3);  Surgeon: Matt Salcedo MD;  Location: [facility];  Service: Orthopedic Spine   • TUBAL ABDOMINAL LIGATION          PT Ortho     Row Name 22 990       Precautions and Contraindications    Contraindications s/p L3-4, L4-5 fusion  -BS       Subjective Pain    Post-Treatment Pain Level 0  -BS          User Key  (r) = Recorded By, (t) = Taken By, (c) = Cosigned By    Initials Name Provider Type    James Gonzalez PT Physical Therapist                             PT Assessment/Plan     Row Name 22 8075          PT Assessment    Functional Limitations Impaired gait;Performance in work activities;Performance in sport activities;Performance in self-care ADL;Performance in  "leisure activities;Limitation in home management  -BS     Impairments Balance;Impaired flexibility;Muscle strength;Pain;Posture;Range of motion;Sensation;Endurance  -BS     Assessment Comments Reduced SI pain post TrP to bilateral QL muscle and good tolerance to QL stretching. Limited by severe TTP and tightness along each QL muscle.  -BS     Rehab Potential Fair  -BS     Patient/caregiver participated in establishment of treatment plan and goals Yes  -BS     Patient would benefit from skilled therapy intervention Yes  -BS            PT Plan    PT Frequency 2x/week  -BS     Predicted Duration of Therapy Intervention (PT) 3-4 weeks then TBD  -BS     PT Plan Comments continue MFR/TrP to each QL muscle. Core stability training.  -BS           User Key  (r) = Recorded By, (t) = Taken By, (c) = Cosigned By    Initials Name Provider Type    James Gonzalez, PT Physical Therapist                 Modalities     Row Name 08/22/22 3768             Ice    Ice Applied Yes  -BS      Location lumbosacral region, in slidelying  -BS      PT Ice Rx Minutes 10  -BS      Ice S/P Rx Yes  -BS            User Key  (r) = Recorded By, (t) = Taken By, (c) = Cosigned By    Initials Name Provider Type    James Gonzalez, PT Physical Therapist               OP Exercises     Row Name 08/22/22 9487             Subjective Comments    Subjective Comments Patient reports poor tolerance to tx 3 days ago.  -BS              Subjective Pain    Able to rate subjective pain? yes  -BS      Pre-Treatment Pain Level 6  -BS      Post-Treatment Pain Level 0  -BS              Exercise 1    Exercise Name 1 Pro II, L2  -BS      Time 1 5'  -BS              Exercise 2    Exercise Name 2 supine B piriformis S  -BS      Sets 2 1  -BS      Reps 2 2  -BS      Time 2 30\" hold  -BS              Exercise 3    Exercise Name 3 supine B QL S  -BS      Sets 3 1  -BS      Reps 3 2  -BS      Time 3 30\" hold  -BS              Exercise 4    Exercise Name 4 see manual  -BS   " "           Exercise 5    Exercise Name 5 standing B QL S  -BS      Sets 5 1  -BS      Reps 5 1  -BS      Time 5 30\" hold  -BS              Exercise 6    Exercise Name 6 see modalities  -BS            User Key  (r) = Recorded By, (t) = Taken By, (c) = Cosigned By    Initials Name Provider Type    BS James Martinez, PT Physical Therapist                         Manual Rx (last 36 hours)     Manual Treatments     Row Name 08/22/22 1654             Manual Rx 1    Manual Rx 1 Location sidelying, bilat QL muscle  -BS      Manual Rx 1 Type TrP to B QL region  -BS      Manual Rx 1 Duration 10 mins  -BS            User Key  (r) = Recorded By, (t) = Taken By, (c) = Cosigned By    Initials Name Provider Type    James Gonzalez, PT Physical Therapist                 PT OP Goals     Row Name 08/22/22 1654          PT Short Term Goals    STG Date to Achieve 08/29/22  -BS     STG 1 Improve lumbar spine AROM (flex/ext) to minimal limit 75%  -BS     STG 1 Progress Ongoing  -BS     STG 2 Improve B LE MMT (hip flex/knee ext) to 5/5  -BS     STG 2 Progress Ongoing  -BS     STG 3 Reduce SI joint pain by 25%  -BS     STG 3 Progress Ongoing  -BS            Long Term Goals    LTG Date to Achieve 09/12/22  -BS     LTG 1 Reduce SI joint pain by 50%  -BS     LTG 1 Progress New;Ongoing  -BS            Time Calculation    PT Goal Re-Cert Due Date 09/05/22  -BS           User Key  (r) = Recorded By, (t) = Taken By, (c) = Cosigned By    Initials Name Provider Type    James Gonzalez, PT Physical Therapist                               Time Calculation:   Start Time: 1654  Stop Time: 1738  Time Calculation (min): 44 min  PT Non-Billable Time (min): 10 min  Total Timed Code Minutes- PT: 34 minute(s)  Untimed Charges  PT Ice Rx Minutes: 10  Total Minutes  Untimed Charges Total Minutes: 10   Total Minutes: 10  Therapy Charges for Today     Code Description Service Date Service Provider Modifiers Qty    56821604298  PT THER PROC EA 15 MIN " 8/22/2022 James Martinez, PT GP 1    13263560017 HC PT MANUAL THERAPY EA 15 MIN 8/22/2022 James Martinez, PT GP 1    [id number]  PT THER SUPP EA 15 MIN 8/22/2022 James Martinez, PT GP 1                    James Martinez, PT  8/22/2022

## 2022-08-24 ENCOUNTER — APPOINTMENT (OUTPATIENT)
Dept: PHYSICAL THERAPY | Facility: HOSPITAL | Age: 57
End: 2022-08-24

## 2022-08-29 ENCOUNTER — APPOINTMENT (OUTPATIENT)
Dept: PHYSICAL THERAPY | Facility: HOSPITAL | Age: 57
End: 2022-08-29

## 2022-08-31 ENCOUNTER — APPOINTMENT (OUTPATIENT)
Dept: PHYSICAL THERAPY | Facility: HOSPITAL | Age: 57
End: 2022-08-31

## 2022-09-01 ENCOUNTER — APPOINTMENT (OUTPATIENT)
Dept: PHYSICAL THERAPY | Facility: HOSPITAL | Age: 57
End: 2022-09-01

## 2022-09-07 ENCOUNTER — HOSPITAL ENCOUNTER (OUTPATIENT)
Dept: PHYSICAL THERAPY | Facility: HOSPITAL | Age: 57
Setting detail: THERAPIES SERIES
Discharge: HOME | End: 2022-09-07

## 2022-09-07 DIAGNOSIS — M53.3 SI (SACROILIAC) JOINT DYSFUNCTION: Primary | ICD-10-CM

## 2022-09-07 PROCEDURE — G0283 ELEC STIM OTHER THAN WOUND: HCPCS

## 2022-09-07 PROCEDURE — 97110 THERAPEUTIC EXERCISES: CPT

## 2022-09-07 NOTE — THERAPY TREATMENT NOTE
Outpatient Physical Therapy Ortho Treatment Note  [facility]     Patient Name: Lashell Hazel  : 1965  MRN: [id number]  Today's Date: 2022      Visit Date: 2022     Subjective Improvement 0  Visits 4/8  Visits approved ?  RTMD PRN  Recert Date 2022    Low back pain    Visit Dx:    ICD-10-CM ICD-9-CM   1. SI (sacroiliac) joint dysfunction  M53.3 724.6       Patient Active Problem List   Diagnosis   • Back pain   • DDD (degenerative disc disease), lumbar   • S/P lumbar fusion        Past Medical History:   Diagnosis Date   • Arthritis    • Asthma    • COPD (chronic obstructive pulmonary disease) ([facility])    • Depression with anxiety    • Diabetes ([facility])     no longer using medication   • Hypertension    • Kidney disease    • Kidney stone    • Lupus ([facility])    • Sleep apnea     using c-pap occasionally   • Stomach ulcer    • Thyroid disease         Past Surgical History:   Procedure Laterality Date   • CHOLECYSTECTOMY     • CYSTOSCOPY INSERTION / REMOVAL STENT / STONE     • FOOT SURGERY Right    • GASTRIC BYPASS     • HYSTERECTOMY     • LUMBAR DIRECT LATERAL INTERBODY FUSION N/A 2018    Procedure: LATERAL INTERBODY FUSION LUMBAR THREE-FOUR, LUMBAR FOUR-FIVE, WITH INSTRUMENTATION,INFUSE BONE GRAFT         neurotC-ARM#3);  Surgeon: Matt Salcedo MD;  Location: [facility];  Service: Orthopedic Spine   • TUBAL ABDOMINAL LIGATION          PT Ortho     Row Name 22 0900       Subjective Comments    Subjective Comments patient states that she hasnt been here because she was exposed to someone with covid.  However, she has tested negative for covid.  She reports that her pain is 10/10  -CP       Precautions and Contraindications    Contraindications s/p L3-4, L4-5 fusion  -CP       Subjective Pain    Able to rate subjective pain? yes  -CP    Pre-Treatment Pain Level 10  -CP       Posture/Observations    Posture/Observations Comments amb with SPC  -CP           User Key  (r) = Recorded By, (t) = Taken By, (c) = Cosigned By    Initials Name Provider Type    Zainab Jurado, TAVARES Physical Therapist Assistant                             PT Assessment/Plan     Row Name 09/07/22 1024          PT Assessment    Assessment Comments Patient presents with increase pain and L SI ant rotation.  SI rotation was corrected using ME  -CP            PT Plan    PT Frequency 2x/week  -CP     Predicted Duration of Therapy Intervention (PT) 3-4 weeks  -CP     PT Plan Comments Cont with POC.  core stab and monitor SI rotations.  -CP           User Key  (r) = Recorded By, (t) = Taken By, (c) = Cosigned By    Initials Name Provider Type    Zainab Jurado, TAVARES Physical Therapist Assistant                 Modalities     Row Name 09/07/22 0900             Moist Heat    MH Applied Yes  -CP      Location low back with IFC  -CP      PT Moist Heat Minutes 15  -CP      MH S/P Rx Yes  -CP              ELECTRICAL STIMULATION    Attended/Unattended Unattended  -CP      Stimulation Type IFC  -CP      Location/Electrode Placement/Other low back with MHP  -CP      PT E-Stim Unattended Minutes 15  -CP            User Key  (r) = Recorded By, (t) = Taken By, (c) = Cosigned By    Initials Name Provider Type    Zainab Jurado, PTA Physical Therapist Assistant               OP Exercises     Row Name 09/07/22 1042 09/07/22 0900          Subjective Comments    Subjective Comments -- patient states that she hasnt been here because she was exposed to someone with covid.  However, she has tested negative for covid.  She reports that her pain is 10/10  -CP            Subjective Pain    Able to rate subjective pain? -- yes  -CP     Pre-Treatment Pain Level -- 10  -CP     Post-Treatment Pain Level -- 4  -CP            Total Minutes    47524 - PT Therapeutic Exercise Minutes 39  -CP --            Exercise 1    Exercise Name 1 -- Pro II level 1  -CP     Time 1 -- 10  -CP            Exercise 2    Exercise  "Name 2 -- incline stretch  -CP     Cueing 2 -- Verbal  -CP     Sets 2 -- 3  -CP     Time 2 -- 30  -CP            Exercise 3    Exercise Name 3 -- standing HS stretch  -CP     Cueing 3 -- Verbal  -CP     Sets 3 -- 3  -CP     Time 3 -- 30  -CP     Additional Comments -- B LE  -CP            Exercise 4    Exercise Name 4 -- seated mid back stretch  -CP     Cueing 4 -- Verbal;Demo  -CP     Sets 4 -- 1  -CP     Reps 4 -- 10  -CP     Time 4 -- 10\" holds  -CP     Additional Comments -- with tball  -CP            Exercise 5    Exercise Name 5 -- LTR stretch  -CP     Cueing 5 -- Verbal;Tactile  -CP     Reps 5 -- 20  -CP            Exercise 6    Exercise Name 6 -- bridging  -CP     Cueing 6 -- Verbal;Tactile  -CP     Sets 6 -- 1  -CP     Reps 6 -- 10  -CP            Exercise 7    Exercise Name 7 -- SI alignment check  -CP            Exercise 8    Exercise Name 8 -- supine TA  -CP     Cueing 8 -- Verbal;Demo  -CP     Sets 8 -- 2  -CP     Reps 8 -- 10  -CP     Time 8 -- 5\" holds  -CP           User Key  (r) = Recorded By, (t) = Taken By, (c) = Cosigned By    Initials Name Provider Type    CP Zainab Moser PTA Physical Therapist Assistant                              PT OP Goals     Row Name 09/07/22 1000          PT Short Term Goals    STG Date to Achieve 08/29/22  -CP     STG 1 Improve lumbar spine AROM (flex/ext) to minimal limit 75%  -CP     STG 1 Progress Ongoing  -CP     STG 2 Improve B LE MMT (hip flex/knee ext) to 5/5  -CP     STG 2 Progress Ongoing  -CP     STG 3 Reduce SI joint pain by 25%  -CP     STG 3 Progress Ongoing  -CP            Long Term Goals    LTG Date to Achieve 09/12/22  -CP     LTG 1 Reduce SI joint pain by 50%  -CP     LTG 1 Progress New;Ongoing  -CP            Time Calculation    PT Goal Re-Cert Due Date 09/05/22  -CP           User Key  (r) = Recorded By, (t) = Taken By, (c) = Cosigned By    Initials Name Provider Type    CP Zainab Moser, PTA Physical Therapist Assistant          "       Therapy Education  Education Details: supine TA  Given: HEP  Program: New  How Provided: Verbal, Demonstration  Provided to: Patient  Level of Understanding: Teach back education performed, Verbalized, Demonstrated              Time Calculation:   Start Time: 0935  Stop Time: 1035  Time Calculation (min): 60 min  Total Timed Code Minutes- PT: 39 minute(s)  Timed Charges  43597 - PT Therapeutic Exercise Minutes: 39  Untimed Charges  PT E-Stim Unattended Minutes: 15  PT Moist Heat Minutes: 15  Total Minutes  Timed Charges Total Minutes: 39  Untimed Charges Total Minutes: 30   Total Minutes: 39  Therapy Charges for Today     Code Description Service Date Service Provider Modifiers Qty    83220645316 HC PT ELECTRICAL STIM UNATTENDED 9/7/2022 Zainab Moser, PTA CQ 1    [id number] HC PT THER PROC EA 15 MIN 9/7/2022 Zainab Moser PTA GP, CQ 3                    Zainab Moser PTA  9/7/2022

## 2022-09-13 ENCOUNTER — APPOINTMENT (OUTPATIENT)
Dept: PHYSICAL THERAPY | Facility: HOSPITAL | Age: 57
End: 2022-09-13

## 2022-09-16 ENCOUNTER — APPOINTMENT (OUTPATIENT)
Dept: PHYSICAL THERAPY | Facility: HOSPITAL | Age: 57
End: 2022-09-16

## (undated) DEVICE — 3M™ STERI-STRIP™ REINFORCED ADHESIVE SKIN CLOSURES, R1547, 1/2 IN X 4 IN (12 MM X 100 MM), 6 STRIPS/ENVELOPE: Brand: 3M™ STERI-STRIP™

## (undated) DEVICE — INSTRUMENT 8670001 SXT GUIDEWIRE BLUNT

## (undated) DEVICE — PAD GRND REM POLYHESIVE A/ DISP

## (undated) DEVICE — SUT VIC 2/0 SH 27IN

## (undated) DEVICE — SEALANT HEMOS FLOSEAL MATRX W/MALL TP 10ML EA/6

## (undated) DEVICE — GLV SURG SENSICARE PI LF PF 8 GRN STRL

## (undated) DEVICE — SUT VIC 2/0 CT1 CR8 18IN J839D

## (undated) DEVICE — SUT VICRYL 1 CP-2 27IN J871H

## (undated) DEVICE — SUT VICRYL O M0-4 27IN ETHCPP71D

## (undated) DEVICE — SUT VICRYL 1 CT1 27IN J261H

## (undated) DEVICE — COVER,TABLE,EXT,80"X84",STRL: Brand: MEDLINE

## (undated) DEVICE — GAUZE,SPONGE,4"X4",16PLY,XRAY,STRL,LF: Brand: MEDLINE

## (undated) DEVICE — LT SOURCE STR TP

## (undated) DEVICE — GLV SURG SENSICARE MICRO PF LF 7.5 STRL

## (undated) DEVICE — PROBE 9450015 NIM BALL TIP 23CM

## (undated) DEVICE — SPNG LAP 18X18IN LF STRL PK/5

## (undated) DEVICE — DRSNG SURESITE123 4X10IN

## (undated) DEVICE — DILATOR SET 945NSD2750 NIM STIMULATED

## (undated) DEVICE — CODMAN® SURGICAL PATTIES 1/2" X 1" (1.27CM X 2.54CM): Brand: CODMAN®

## (undated) DEVICE — GLV SURG SENSICARE PI PF LF 7 GRN STRL

## (undated) DEVICE — KNIF BAYO 188MM

## (undated) DEVICE — SHEET,DRAPE,53X77,STERILE: Brand: MEDLINE

## (undated) DEVICE — GLV SURG TRIUMPH ORTHO W/ALOE PF LTX 7.0

## (undated) DEVICE — GOWN,AURORA,NOREINF,RAGLAN,XL,STERILE: Brand: MEDLINE

## (undated) DEVICE — GLV SURG SENSICARE PI LF PF 7.5 GRN STRL

## (undated) DEVICE — ENDOPATH 5MM ENDOSCOPIC BLUNT TIP DISSECTORS (12 POUCHES CONTAINING 3 DISSECTORS EACH): Brand: ENDOPATH

## (undated) DEVICE — GLV SURG SENSICARE POLYISPRN W/ALOE PF LF 6.5 GRN STRL

## (undated) DEVICE — PK SPINE LF 60